# Patient Record
Sex: FEMALE | Race: WHITE | NOT HISPANIC OR LATINO | Employment: UNEMPLOYED | URBAN - METROPOLITAN AREA
[De-identification: names, ages, dates, MRNs, and addresses within clinical notes are randomized per-mention and may not be internally consistent; named-entity substitution may affect disease eponyms.]

---

## 2020-09-12 ENCOUNTER — APPOINTMENT (OUTPATIENT)
Dept: RADIOLOGY | Facility: MEDICAL CENTER | Age: 60
DRG: 392 | End: 2020-09-12
Attending: EMERGENCY MEDICINE
Payer: COMMERCIAL

## 2020-09-12 ENCOUNTER — APPOINTMENT (OUTPATIENT)
Dept: RADIOLOGY | Facility: MEDICAL CENTER | Age: 60
DRG: 392 | End: 2020-09-12
Attending: INTERNAL MEDICINE
Payer: COMMERCIAL

## 2020-09-12 ENCOUNTER — HOSPITAL ENCOUNTER (INPATIENT)
Facility: MEDICAL CENTER | Age: 60
LOS: 4 days | DRG: 392 | End: 2020-09-16
Attending: EMERGENCY MEDICINE | Admitting: INTERNAL MEDICINE
Payer: COMMERCIAL

## 2020-09-12 DIAGNOSIS — K52.9 ACUTE COLITIS: ICD-10-CM

## 2020-09-12 DIAGNOSIS — A41.9 SEPSIS WITHOUT ACUTE ORGAN DYSFUNCTION, DUE TO UNSPECIFIED ORGANISM (HCC): ICD-10-CM

## 2020-09-12 PROBLEM — F10.20 ALCOHOL DEPENDENCE (HCC): Status: ACTIVE | Noted: 2020-09-12

## 2020-09-12 PROBLEM — E87.20 LACTIC ACIDOSIS: Status: ACTIVE | Noted: 2020-09-12

## 2020-09-12 PROBLEM — D72.829 LEUKOCYTOSIS: Status: ACTIVE | Noted: 2020-09-12

## 2020-09-12 PROBLEM — R74.01 TRANSAMINITIS: Status: ACTIVE | Noted: 2020-09-12

## 2020-09-12 LAB
ABO + RH BLD: NORMAL
ABO GROUP BLD: NORMAL
ALBUMIN SERPL BCP-MCNC: 4.7 G/DL (ref 3.2–4.9)
ALBUMIN/GLOB SERPL: 2 G/DL
ALP SERPL-CCNC: 108 U/L (ref 30–99)
ALT SERPL-CCNC: 66 U/L (ref 2–50)
ANION GAP SERPL CALC-SCNC: 16 MMOL/L (ref 7–16)
APTT PPP: 22.1 SEC (ref 24.7–36)
AST SERPL-CCNC: 48 U/L (ref 12–45)
BASOPHILS # BLD AUTO: 0.4 % (ref 0–1.8)
BASOPHILS # BLD: 0.06 K/UL (ref 0–0.12)
BILIRUB SERPL-MCNC: 0.9 MG/DL (ref 0.1–1.5)
BLD GP AB SCN SERPL QL: NORMAL
BUN SERPL-MCNC: 15 MG/DL (ref 8–22)
CALCIUM SERPL-MCNC: 9.4 MG/DL (ref 8.4–10.2)
CHLORIDE SERPL-SCNC: 97 MMOL/L (ref 96–112)
CO2 SERPL-SCNC: 23 MMOL/L (ref 20–33)
COVID ORDER STATUS COVID19: NORMAL
CREAT SERPL-MCNC: 0.51 MG/DL (ref 0.5–1.4)
EOSINOPHIL # BLD AUTO: 0.01 K/UL (ref 0–0.51)
EOSINOPHIL NFR BLD: 0.1 % (ref 0–6.9)
ERYTHROCYTE [DISTWIDTH] IN BLOOD BY AUTOMATED COUNT: 38.4 FL (ref 35.9–50)
GLOBULIN SER CALC-MCNC: 2.4 G/DL (ref 1.9–3.5)
GLUCOSE SERPL-MCNC: 146 MG/DL (ref 65–99)
HCT VFR BLD AUTO: 42.7 % (ref 37–47)
HGB BLD-MCNC: 12.8 G/DL (ref 12–16)
HGB BLD-MCNC: 13 G/DL (ref 12–16)
HGB BLD-MCNC: 14.7 G/DL (ref 12–16)
IMM GRANULOCYTES # BLD AUTO: 0.07 K/UL (ref 0–0.11)
IMM GRANULOCYTES NFR BLD AUTO: 0.5 % (ref 0–0.9)
INR PPP: 1.02 (ref 0.87–1.13)
LACTATE BLD-SCNC: 2 MMOL/L (ref 0.5–2)
LACTATE BLD-SCNC: 2.2 MMOL/L (ref 0.5–2)
LIPASE SERPL-CCNC: 18 U/L (ref 7–58)
LYMPHOCYTES # BLD AUTO: 0.97 K/UL (ref 1–4.8)
LYMPHOCYTES NFR BLD: 6.8 % (ref 22–41)
MCH RBC QN AUTO: 30.6 PG (ref 27–33)
MCHC RBC AUTO-ENTMCNC: 34.4 G/DL (ref 33.6–35)
MCV RBC AUTO: 88.8 FL (ref 81.4–97.8)
MONOCYTES # BLD AUTO: 0.89 K/UL (ref 0–0.85)
MONOCYTES NFR BLD AUTO: 6.2 % (ref 0–13.4)
NEUTROPHILS # BLD AUTO: 12.3 K/UL (ref 2–7.15)
NEUTROPHILS NFR BLD: 86 % (ref 44–72)
NRBC # BLD AUTO: 0 K/UL
NRBC BLD-RTO: 0 /100 WBC
PLATELET # BLD AUTO: 163 K/UL (ref 164–446)
PMV BLD AUTO: 10.8 FL (ref 9–12.9)
POTASSIUM SERPL-SCNC: 4.2 MMOL/L (ref 3.6–5.5)
PROT SERPL-MCNC: 7.1 G/DL (ref 6–8.2)
PROTHROMBIN TIME: 13.1 SEC (ref 12–14.6)
RBC # BLD AUTO: 4.81 M/UL (ref 4.2–5.4)
RH BLD: NORMAL
SARS-COV-2 RNA RESP QL NAA+PROBE: NOTDETECTED
SODIUM SERPL-SCNC: 136 MMOL/L (ref 135–145)
SPECIMEN SOURCE: NORMAL
WBC # BLD AUTO: 14.3 K/UL (ref 4.8–10.8)

## 2020-09-12 PROCEDURE — 700111 HCHG RX REV CODE 636 W/ 250 OVERRIDE (IP): Performed by: EMERGENCY MEDICINE

## 2020-09-12 PROCEDURE — 85018 HEMOGLOBIN: CPT

## 2020-09-12 PROCEDURE — 99223 1ST HOSP IP/OBS HIGH 75: CPT | Performed by: INTERNAL MEDICINE

## 2020-09-12 PROCEDURE — 36415 COLL VENOUS BLD VENIPUNCTURE: CPT

## 2020-09-12 PROCEDURE — 700102 HCHG RX REV CODE 250 W/ 637 OVERRIDE(OP): Performed by: HOSPITALIST

## 2020-09-12 PROCEDURE — 85610 PROTHROMBIN TIME: CPT

## 2020-09-12 PROCEDURE — 700105 HCHG RX REV CODE 258: Performed by: INTERNAL MEDICINE

## 2020-09-12 PROCEDURE — U0003 INFECTIOUS AGENT DETECTION BY NUCLEIC ACID (DNA OR RNA); SEVERE ACUTE RESPIRATORY SYNDROME CORONAVIRUS 2 (SARS-COV-2) (CORONAVIRUS DISEASE [COVID-19]), AMPLIFIED PROBE TECHNIQUE, MAKING USE OF HIGH THROUGHPUT TECHNOLOGIES AS DESCRIBED BY CMS-2020-01-R: HCPCS

## 2020-09-12 PROCEDURE — 76705 ECHO EXAM OF ABDOMEN: CPT

## 2020-09-12 PROCEDURE — 83690 ASSAY OF LIPASE: CPT

## 2020-09-12 PROCEDURE — 83605 ASSAY OF LACTIC ACID: CPT | Mod: 91

## 2020-09-12 PROCEDURE — 700111 HCHG RX REV CODE 636 W/ 250 OVERRIDE (IP)

## 2020-09-12 PROCEDURE — 700105 HCHG RX REV CODE 258: Performed by: EMERGENCY MEDICINE

## 2020-09-12 PROCEDURE — 96365 THER/PROPH/DIAG IV INF INIT: CPT

## 2020-09-12 PROCEDURE — 85730 THROMBOPLASTIN TIME PARTIAL: CPT

## 2020-09-12 PROCEDURE — C9803 HOPD COVID-19 SPEC COLLECT: HCPCS | Performed by: INTERNAL MEDICINE

## 2020-09-12 PROCEDURE — 700117 HCHG RX CONTRAST REV CODE 255: Performed by: EMERGENCY MEDICINE

## 2020-09-12 PROCEDURE — 74177 CT ABD & PELVIS W/CONTRAST: CPT

## 2020-09-12 PROCEDURE — 86900 BLOOD TYPING SEROLOGIC ABO: CPT

## 2020-09-12 PROCEDURE — 80053 COMPREHEN METABOLIC PANEL: CPT

## 2020-09-12 PROCEDURE — A9270 NON-COVERED ITEM OR SERVICE: HCPCS | Performed by: HOSPITALIST

## 2020-09-12 PROCEDURE — 86901 BLOOD TYPING SEROLOGIC RH(D): CPT

## 2020-09-12 PROCEDURE — 700101 HCHG RX REV CODE 250: Performed by: INTERNAL MEDICINE

## 2020-09-12 PROCEDURE — 85025 COMPLETE CBC W/AUTO DIFF WBC: CPT

## 2020-09-12 PROCEDURE — 86850 RBC ANTIBODY SCREEN: CPT

## 2020-09-12 PROCEDURE — 99285 EMERGENCY DEPT VISIT HI MDM: CPT

## 2020-09-12 PROCEDURE — 96375 TX/PRO/DX INJ NEW DRUG ADDON: CPT

## 2020-09-12 PROCEDURE — 87040 BLOOD CULTURE FOR BACTERIA: CPT | Mod: 91

## 2020-09-12 PROCEDURE — 770021 HCHG ROOM/CARE - ISO PRIVATE

## 2020-09-12 PROCEDURE — 700111 HCHG RX REV CODE 636 W/ 250 OVERRIDE (IP): Performed by: INTERNAL MEDICINE

## 2020-09-12 RX ORDER — LISINOPRIL 10 MG/1
10 TABLET ORAL DAILY
Status: ON HOLD | COMMUNITY
End: 2020-09-13

## 2020-09-12 RX ORDER — SODIUM CHLORIDE 9 MG/ML
INJECTION, SOLUTION INTRAVENOUS CONTINUOUS
Status: DISCONTINUED | OUTPATIENT
Start: 2020-09-12 | End: 2020-09-15

## 2020-09-12 RX ORDER — DICYCLOMINE HYDROCHLORIDE 10 MG/1
10 CAPSULE ORAL
Status: ON HOLD | COMMUNITY
End: 2020-09-13

## 2020-09-12 RX ORDER — ONDANSETRON 2 MG/ML
4 INJECTION INTRAMUSCULAR; INTRAVENOUS EVERY 4 HOURS PRN
Status: DISCONTINUED | OUTPATIENT
Start: 2020-09-12 | End: 2020-09-16 | Stop reason: HOSPADM

## 2020-09-12 RX ORDER — CIPROFLOXACIN 2 MG/ML
INJECTION, SOLUTION INTRAVENOUS
Status: COMPLETED
Start: 2020-09-12 | End: 2020-09-12

## 2020-09-12 RX ORDER — SODIUM CHLORIDE 9 MG/ML
1000 INJECTION, SOLUTION INTRAVENOUS ONCE
Status: COMPLETED | OUTPATIENT
Start: 2020-09-12 | End: 2020-09-12

## 2020-09-12 RX ORDER — PROCHLORPERAZINE EDISYLATE 5 MG/ML
5-10 INJECTION INTRAMUSCULAR; INTRAVENOUS EVERY 4 HOURS PRN
Status: DISCONTINUED | OUTPATIENT
Start: 2020-09-12 | End: 2020-09-16 | Stop reason: HOSPADM

## 2020-09-12 RX ORDER — AMOXICILLIN 500 MG/1
500 CAPSULE ORAL 2 TIMES DAILY
Status: ON HOLD | COMMUNITY
End: 2020-09-13

## 2020-09-12 RX ORDER — PROMETHAZINE HYDROCHLORIDE 25 MG/1
12.5-25 SUPPOSITORY RECTAL EVERY 4 HOURS PRN
Status: DISCONTINUED | OUTPATIENT
Start: 2020-09-12 | End: 2020-09-16 | Stop reason: HOSPADM

## 2020-09-12 RX ORDER — LISINOPRIL 5 MG/1
10 TABLET ORAL DAILY
Status: DISCONTINUED | OUTPATIENT
Start: 2020-09-12 | End: 2020-09-16 | Stop reason: HOSPADM

## 2020-09-12 RX ORDER — CIPROFLOXACIN 2 MG/ML
400 INJECTION, SOLUTION INTRAVENOUS EVERY 12 HOURS
Status: DISCONTINUED | OUTPATIENT
Start: 2020-09-12 | End: 2020-09-16 | Stop reason: HOSPADM

## 2020-09-12 RX ORDER — TRAZODONE HYDROCHLORIDE 50 MG/1
50 TABLET ORAL
Status: DISCONTINUED | OUTPATIENT
Start: 2020-09-12 | End: 2020-09-16 | Stop reason: HOSPADM

## 2020-09-12 RX ORDER — SODIUM CHLORIDE AND POTASSIUM CHLORIDE 150; 900 MG/100ML; MG/100ML
INJECTION, SOLUTION INTRAVENOUS CONTINUOUS
Status: DISCONTINUED | OUTPATIENT
Start: 2020-09-12 | End: 2020-09-12

## 2020-09-12 RX ORDER — PROMETHAZINE HYDROCHLORIDE 25 MG/1
12.5-25 TABLET ORAL EVERY 4 HOURS PRN
Status: DISCONTINUED | OUTPATIENT
Start: 2020-09-12 | End: 2020-09-16 | Stop reason: HOSPADM

## 2020-09-12 RX ORDER — SIMVASTATIN 20 MG
20 TABLET ORAL NIGHTLY
Status: ON HOLD | COMMUNITY
End: 2020-09-13

## 2020-09-12 RX ORDER — ONDANSETRON 4 MG/1
4 TABLET, ORALLY DISINTEGRATING ORAL EVERY 4 HOURS PRN
Status: DISCONTINUED | OUTPATIENT
Start: 2020-09-12 | End: 2020-09-16 | Stop reason: HOSPADM

## 2020-09-12 RX ORDER — MORPHINE SULFATE 4 MG/ML
4 INJECTION, SOLUTION INTRAMUSCULAR; INTRAVENOUS ONCE
Status: COMPLETED | OUTPATIENT
Start: 2020-09-12 | End: 2020-09-12

## 2020-09-12 RX ORDER — SODIUM CHLORIDE, SODIUM LACTATE, POTASSIUM CHLORIDE, AND CALCIUM CHLORIDE .6; .31; .03; .02 G/100ML; G/100ML; G/100ML; G/100ML
1300 INJECTION, SOLUTION INTRAVENOUS
Status: DISCONTINUED | OUTPATIENT
Start: 2020-09-12 | End: 2020-09-16

## 2020-09-12 RX ADMIN — ENOXAPARIN SODIUM 40 MG: 40 INJECTION SUBCUTANEOUS at 10:03

## 2020-09-12 RX ADMIN — TRAZODONE HYDROCHLORIDE 50 MG: 50 TABLET ORAL at 22:32

## 2020-09-12 RX ADMIN — PIPERACILLIN AND TAZOBACTAM 4.5 G: 4; .5 INJECTION, POWDER, LYOPHILIZED, FOR SOLUTION INTRAVENOUS; PARENTERAL at 07:34

## 2020-09-12 RX ADMIN — METRONIDAZOLE 500 MG: 500 INJECTION, SOLUTION INTRAVENOUS at 14:23

## 2020-09-12 RX ADMIN — CIPROFLOXACIN 400 MG: 2 INJECTION, SOLUTION INTRAVENOUS at 08:17

## 2020-09-12 RX ADMIN — METRONIDAZOLE 500 MG: 500 INJECTION, SOLUTION INTRAVENOUS at 22:13

## 2020-09-12 RX ADMIN — ONDANSETRON HYDROCHLORIDE 4 MG: 2 SOLUTION INTRAMUSCULAR; INTRAVENOUS at 14:23

## 2020-09-12 RX ADMIN — SODIUM CHLORIDE: 9 INJECTION, SOLUTION INTRAVENOUS at 14:00

## 2020-09-12 RX ADMIN — IOHEXOL 100 ML: 350 INJECTION, SOLUTION INTRAVENOUS at 06:28

## 2020-09-12 RX ADMIN — METRONIDAZOLE 500 MG: 500 INJECTION, SOLUTION INTRAVENOUS at 10:02

## 2020-09-12 RX ADMIN — MORPHINE SULFATE 4 MG: 4 INJECTION INTRAVENOUS at 05:35

## 2020-09-12 RX ADMIN — POTASSIUM CHLORIDE AND SODIUM CHLORIDE: 900; 150 INJECTION, SOLUTION INTRAVENOUS at 09:51

## 2020-09-12 RX ADMIN — ONDANSETRON HYDROCHLORIDE 4 MG: 2 SOLUTION INTRAMUSCULAR; INTRAVENOUS at 09:51

## 2020-09-12 RX ADMIN — PROCHLORPERAZINE EDISYLATE 10 MG: 5 INJECTION INTRAMUSCULAR; INTRAVENOUS at 11:01

## 2020-09-12 RX ADMIN — SODIUM CHLORIDE 1000 ML: 9 INJECTION, SOLUTION INTRAVENOUS at 05:35

## 2020-09-12 RX ADMIN — CIPROFLOXACIN 400 MG: 2 INJECTION, SOLUTION INTRAVENOUS at 17:13

## 2020-09-12 SDOH — HEALTH STABILITY: MENTAL HEALTH: HOW MANY STANDARD DRINKS CONTAINING ALCOHOL DO YOU HAVE ON A TYPICAL DAY?: 3 OR 4

## 2020-09-12 SDOH — HEALTH STABILITY: MENTAL HEALTH: HOW OFTEN DO YOU HAVE 6 OR MORE DRINKS ON ONE OCCASION?: MONTHLY

## 2020-09-12 SDOH — HEALTH STABILITY: MENTAL HEALTH: HOW OFTEN DO YOU HAVE A DRINK CONTAINING ALCOHOL?: 4 OR MORE TIMES A WEEK

## 2020-09-12 ASSESSMENT — COGNITIVE AND FUNCTIONAL STATUS - GENERAL
SUGGESTED CMS G CODE MODIFIER DAILY ACTIVITY: CH
MOBILITY SCORE: 24
DAILY ACTIVITIY SCORE: 24
SUGGESTED CMS G CODE MODIFIER MOBILITY: CH

## 2020-09-12 ASSESSMENT — ENCOUNTER SYMPTOMS
SHORTNESS OF BREATH: 0
FEVER: 0
WEAKNESS: 0
VOMITING: 0
COUGH: 0
DIARRHEA: 1
FOCAL WEAKNESS: 0
HEARTBURN: 0
CHILLS: 0
HALLUCINATIONS: 0
HEADACHES: 0
BLOOD IN STOOL: 1
SORE THROAT: 0
BACK PAIN: 0
NAUSEA: 1
PALPITATIONS: 0
ABDOMINAL PAIN: 1
MYALGIAS: 0
DEPRESSION: 0
DIZZINESS: 0

## 2020-09-12 ASSESSMENT — LIFESTYLE VARIABLES
TOTAL SCORE: 5
AVERAGE NUMBER OF DAYS PER WEEK YOU HAVE A DRINK CONTAINING ALCOHOL: 7
CONSUMPTION TOTAL: POSITIVE
VISUAL DISTURBANCES: NOT PRESENT
ALCOHOL_USE: YES
HEADACHE, FULLNESS IN HEAD: NOT PRESENT
AUDITORY DISTURBANCES: NOT PRESENT
EVER HAD A DRINK FIRST THING IN THE MORNING TO STEADY YOUR NERVES TO GET RID OF A HANGOVER: NO
HAVE YOU EVER FELT YOU SHOULD CUT DOWN ON YOUR DRINKING: NO
TOTAL SCORE: 2
HAVE PEOPLE ANNOYED YOU BY CRITICIZING YOUR DRINKING: YES
ORIENTATION AND CLOUDING OF SENSORIUM: ORIENTED AND CAN DO SERIAL ADDITIONS
TREMOR: TREMOR NOT VISIBLE BUT CAN BE FELT, FINGERTIP TO FINGERTIP
HOW MANY TIMES IN THE PAST YEAR HAVE YOU HAD 5 OR MORE DRINKS IN A DAY: 4
ANXIETY: MILDLY ANXIOUS
TOTAL SCORE: 2
TOTAL SCORE: 2
ON A TYPICAL DAY WHEN YOU DRINK ALCOHOL HOW MANY DRINKS DO YOU HAVE: 2
EVER FELT BAD OR GUILTY ABOUT YOUR DRINKING: YES
PAROXYSMAL SWEATS: BARELY PERCEPTIBLE SWEATING. PALMS MOIST
AGITATION: NORMAL ACTIVITY
NAUSEA AND VOMITING: *

## 2020-09-12 ASSESSMENT — PATIENT HEALTH QUESTIONNAIRE - PHQ9
SUM OF ALL RESPONSES TO PHQ9 QUESTIONS 1 AND 2: 0
1. LITTLE INTEREST OR PLEASURE IN DOING THINGS: NOT AT ALL
2. FEELING DOWN, DEPRESSED, IRRITABLE, OR HOPELESS: NOT AT ALL

## 2020-09-12 ASSESSMENT — FIBROSIS 4 INDEX: FIB4 SCORE: 2.17

## 2020-09-12 NOTE — ASSESSMENT & PLAN NOTE
-Admits to 1 bottle of wine nightly with occasional binge drinking  -No evidence of acute alcohol withdrawal  -Initiate CIWA protocol as clinically indicated  -Seizure precautions  -Daily MVI, folate and thiamine

## 2020-09-12 NOTE — H&P
Hospital Medicine History & Physical Note    Date of Service  9/12/2020    Primary Care Physician  None    Consultants  None    Code Status  Full Code    Chief Complaint  Chief Complaint   Patient presents with   • Abdominal Pain   • Diarrhea   • Nausea   • Bloody Stools       History of Presenting Illness  60 y.o. female with a history of alcohol abuse, hyperlipidemia, hypertension who presented 9/12/2020 with severe lower abdominal pain associated with bright red blood per rectum and nausea.    Patient says she was in her normal state of health yesterday morning and was able to eat and drink normally until the afternoon when she experienced sudden onset bilateral lower quadrant discomfort.  She describes it as a cramping pain that was severe in nature.  She had associated diarrhea that contained blood.  She had frequent episodes, more than 1/h overnight and into this morning.  She has been unable to eat or drink anything due to nausea.  She came to the ER for evaluation.  She denies any raw fish or meat consumption, no sick contacts, no recent travel.  She denies fever however temperature measured 99.5.  She denies vomiting however developed severe nausea and dry heaving during her initial evaluation.  She denies any shortness of breath or cough.    She drinks alcohol daily and states she binge drinks occasionally as well.  Her averages approximately a bottle of wine nightly.    She has upcoming dental procedure therefore was prescribed Augmentin as prophylaxis prior to this procedure.  She has completed approximately 2 days of therapy.    ER course: Afebrile, blood pressure stable, pulse 65, 95% on room air.  CBC noted to be 14.3, AST 48 ALT 66, alk phos 108.  Lipase 18, lactic acid 2.2.  She was started on Zosyn by the emergency physician and IV fluids.  CT scan revealed acute, severe colitis of the mid transverse through distal sigmoid colon.  She will be admitted for IV antibiotic therapy and IVF.    Review of  Systems  Review of Systems   Constitutional: Positive for malaise/fatigue. Negative for chills and fever.   HENT: Negative for sore throat.    Respiratory: Negative for cough and shortness of breath.    Cardiovascular: Negative for chest pain and palpitations.   Gastrointestinal: Positive for abdominal pain, blood in stool, diarrhea and nausea. Negative for heartburn and vomiting.   Genitourinary: Negative for dysuria and frequency.   Musculoskeletal: Negative for back pain and myalgias.   Neurological: Negative for dizziness, focal weakness, weakness and headaches.   Psychiatric/Behavioral: Negative for depression and hallucinations.   All other systems reviewed and are negative.      Past Medical History  Hypertension, hyperlipidemia, daily alcohol use    Surgical History  Denies previous GI surgeries    Family History  Denies family history of inflammatory bowel disease    Social History   reports that she has never smoked. She has never used smokeless tobacco. She reports current alcohol use of about 12.0 oz of alcohol per week. She reports current drug use. Drug: Inhaled.    Allergies  Not on File    Medications  Prior to Admission Medications   Prescriptions Last Dose Informant Patient Reported? Taking?   amoxicillin (AMOXIL) 500 MG Cap 9/11/2020 at 0800  Yes Yes   Sig: Take 500 mg by mouth 2 times a day. Prior to dental work   dicyclomine (BENTYL) 10 MG Cap 9/11/2020 at 0800  Yes Yes   Sig: Take 10 mg by mouth 4 Times a Day,Before Meals and at Bedtime.   lisinopril (PRINIVIL) 10 MG Tab 9/11/2020 at 0800  Yes Yes   Sig: Take 10 mg by mouth every day.   simvastatin (ZOCOR) 20 MG Tab   Yes Yes   Sig: Take 20 mg by mouth every evening.      Facility-Administered Medications: None       Physical Exam  Temp:  [36.6 °C (97.9 °F)-37.3 °C (99.2 °F)] 37.3 °C (99.2 °F)  Pulse:  [59-69] 64  Resp:  [18] 18  BP: (119-143)/(68-78) 128/68  SpO2:  [94 %-99 %] 94 %    Physical Exam  Constitutional:       General: She is in  acute distress.      Appearance: She is ill-appearing, toxic-appearing and diaphoretic.      Comments: Dry heaving, uncomfortable   HENT:      Nose: Nose normal.      Mouth/Throat:      Mouth: Mucous membranes are dry.   Neck:      Musculoskeletal: No muscular tenderness.   Cardiovascular:      Rate and Rhythm: Normal rate and regular rhythm.      Pulses: Normal pulses.   Pulmonary:      Effort: Pulmonary effort is normal.      Breath sounds: Normal breath sounds.   Abdominal:      General: There is no distension.      Palpations: Abdomen is soft.      Tenderness: There is abdominal tenderness. There is guarding.   Musculoskeletal:         General: No swelling.   Lymphadenopathy:      Cervical: No cervical adenopathy.   Skin:     General: Skin is warm.      Coloration: Skin is pale.      Findings: No rash.   Neurological:      General: No focal deficit present.      Mental Status: She is oriented to person, place, and time.      Motor: Weakness present.   Psychiatric:         Mood and Affect: Mood normal.         Thought Content: Thought content normal.         Laboratory:  Recent Labs     09/12/20  0525   WBC 14.3*   RBC 4.81   HEMOGLOBIN 14.7   HEMATOCRIT 42.7   MCV 88.8   MCH 30.6   MCHC 34.4   RDW 38.4   PLATELETCT 163*   MPV 10.8     Recent Labs     09/12/20  0517   SODIUM 136   POTASSIUM 4.2   CHLORIDE 97   CO2 23   GLUCOSE 146*   BUN 15   CREATININE 0.51   CALCIUM 9.4     Recent Labs     09/12/20  0517   ALTSGPT 66*   ASTSGOT 48*   ALKPHOSPHAT 108*   TBILIRUBIN 0.9   LIPASE 18   GLUCOSE 146*     Recent Labs     09/12/20  0517   APTT 22.1*   INR 1.02     No results for input(s): NTPROBNP in the last 72 hours.      No results for input(s): TROPONINT in the last 72 hours.    Imaging:  CT-ABDOMEN-PELVIS WITH   Final Result         1.  Severe colitis of the mid transverse through the distal sigmoid colon, consider infectious, inflammatory, or ischemic etiologies. Differential could include intramural hematoma in  the appropriate clinical setting however density of bowel wall is less    than would be typically expected for hematoma.   2.  Hepatomegaly and diffuse hepatic steatosis      US-RUQ    (Results Pending)         Assessment/Plan:  I anticipate this patient will require at least two midnights for appropriate medical management, necessitating inpatient admission.    * Colitis  Assessment & Plan  Seen on imaging.  I suspect infectious versus ischemic etiology and less likely inflammatory bowel disease given no personal or family history.  Check stool culture, fecal WBCs  Fecal calprotectin  Check C. Difficile  Empiric coverage with Cipro Flagyl in the setting of acute colitis  IVF, avoid hypotension    Lactic acidosis  Assessment & Plan  Due to colitis  IVF  Trend lactate    Leukocytosis  Assessment & Plan  Likely related to colitis seen on imaging.  She has hematochezia and diarrhea.  She recently took Augmentin however only for a few days due to to prophylaxis for dental procedure.  Check C. difficile  IV fluids  Empiric antibiotics with Cipro and Flagyl    Transaminitis  Assessment & Plan  She drinks 4 glasses of wine daily, denies right upper quadrant pain, rather pain is localized to bilateral lower quadrants.  We will check RUQ US for evidence of gallstone versus fatty liver versus cirrhosis in the setting of heavy alcohol use  For now I will hold her statin  Repeat LFTs in a.m.

## 2020-09-12 NOTE — ED PROVIDER NOTES
"ED Provider Note    CHIEF COMPLAINT  Chief Complaint   Patient presents with   • Abdominal Pain   • Diarrhea   • Nausea   • Bloody Stools       HPI  Ayde Stewart is a 60 y.o. female who presents with a chief complaint of abdominal pain and hematochezia.  The patient notes that she has had abdominal discomfort for the past 2 to 3 months which she describes as \"not feeling comfortable in my own skin\".  She notes mild distention but no significant pain.  She and her  live in the [facility] but came to the [facility] yesterday to get away from the smoke in the [facility].  After arrival and approximately 30 minutes after eating breakfast she and her  went on a bike ride during which time she developed diffuse abdominal cramps.  She went to the restroom where she had to strain in order to have a small, \"muddy\" stool.  After this, she had large-volume diarrhea which was mixed in with a large volume of bright red blood.  This lasted for approximately 1 hour.  Her  called the paramedics and when they arrived she was feeling better so decided not to go to the hospital.  She was still having some mild cramping and so her  drove her back to her BnB at which time she had multiple additional episodes of nishant hematochezia and clots.  She was then seen at [facility] emergency room where labs were performed and she was discharged with a prescription for Bentyl.  After return to her hotel, she had worsening abdominal cramping which she could not get rid of and asked her  to once again called the paramedics at which time she was brought to [facility] for higher level of care.  She has never had a colonoscopy and is not anticoagulated.  She denies any fevers or chills, chest pain, shortness of breath, nausea, vomiting, dysuria.  She has a remote history of appendectomy.  Patient does note routine significant alcohol use; drinks approximately 1 bottle of wine at least 5 days/week and has no history of " alcohol withdrawal seizure.  No NSAID use.    REVIEW OF SYSTEMS  See HPI for further details.  Abdominal cramping.  Hematochezia.  All other systems are negative.     PAST MEDICAL HISTORY       SOCIAL HISTORY  Social History     Tobacco Use   • Smoking status: Not on file   Substance and Sexual Activity   • Alcohol use: Not on file   • Drug use: Not on file   • Sexual activity: Not on file       SURGICAL HISTORY  patient denies any surgical history    CURRENT MEDICATIONS  Home Medications    **Home medications have not yet been reviewed for this encounter**         ALLERGIES  Not on File    PHYSICAL EXAM  VITAL SIGNS: /77   Pulse 64   Temp 36.6 °C (97.9 °F) (Temporal)   Resp 18   Wt 79 kg (174 lb 2.6 oz)   SpO2 99%    Pulse ox interpretation: I interpret this pulse ox as normal.  Constitutional: Alert in no apparent distress.  HENT: No signs of trauma, Bilateral external ears normal, Nose normal.  Dry mucous membranes.  Eyes: Pupils are equal and reactive, Conjunctiva normal, Non-icteric.   Neck: Normal range of motion, No tenderness, Supple, No stridor.   Lymphatic: No lymphadenopathy noted.   Cardiovascular: Regular rate and rhythm, no murmurs.   Thorax & Lungs: Normal breath sounds, No respiratory distress, No wheezing, No chest tenderness.   Abdomen: Bowel sounds normal, Soft, No tenderness, No masses, No pulsatile masses. No peritoneal signs.  Rectal: Gross bright red blood in the rectal vault.  No obvious external or internal hemorrhoids.  Skin: Warm, Dry, No erythema, No rash.   Back: Normal alignment.  Extremities: Intact distal pulses, No edema, No tenderness, No cyanosis.  Musculoskeletal: Good range of motion in all major joints. No tenderness to palpation or major deformities noted.   Neurologic: Alert, Normal motor function, Normal sensory function, No focal deficits noted.   Psychiatric: Affect normal, Judgment normal, Mood normal.     DIAGNOSTIC STUDIES /  PROCEDURES    LABS  CBC  CMP  Lipase  Type and hold  Lactic acid  PT/INR  PTT    RADIOLOGY  CT abdomen/pelvis    COURSE & MEDICAL DECISION MAKING  Pertinent Labs & Imaging studies reviewed. (See chart for details)  This is a 60-year-old female with no significant past medical history who is here with severe abdominal cramping and significant hematochezia.  Differential diagnosis includes, but is not limited to, colitis, mesenteric ischemia, diverticulosis, diverticulitis, viral syndrome, neoplasm.  Arrives afebrile with slightly elevated blood pressure but otherwise normal vital signs.  Appears dehydrated with dry mucous membranes but nontoxic.  Abdomen is soft without peritoneal signs.  She does have significant bright red blood in the rectal vault.    Patient started on IV fluids for dehydration and significant fluid loss through diarrhea and given a dose of morphine for continued abdominal cramping.    CBC reveals leukocytosis with neutrophilic predominance.  H/H is normal.  Metabolic panel reveals blood glucose of 146 with transaminitis AST/ALT/alk phos of 48/66/108.  Lipase is normal.  Lactic acid slightly elevated to 2.2.  Coags are normal.    Upon repeat evaluation, patient is resting comfortably.  She notes that the morphine improved her abdominal discomfort.     CT scan of the abdomen/pelvis pending although I suspect that this is colitis.  Antibiotics deferred until return of CT scan but given her alcohol use Zosyn will be an appropriate choice should this be colitis.  Patient care was transferred to my colleague pending return of CT scan read and my colleague will discuss the patient with the hospitalist for admission.    HYDRATION  HYDRATION: Based on the patient's presentation of Acute Diarrhea and Dehydration the patient was given IV fluids. IV Hydration was used because oral hydration was not adequate alone. Upon recheck following hydration, the patient was Improved.    FINAL IMPRESSION  1.  Abdominal  cramping  2.  Hematochezia  3.  Lactic acidosis  4.  Leukocytosis  5.  Transaminitis  6.  Alcohol abuse    Electronically signed by: Juan Griggs M.D., 9/12/2020 5:13 AM

## 2020-09-12 NOTE — PROGRESS NOTES
2 RN Skin Check    2 RN skin check complete.   Devices in place: SCDs.  Skin assessed under devices: yes.  Confirmed pressure ulcers found on: None.  New potential pressure ulcers noted on None. Wound consult placed No.  The following interventions in place Pillows.    No signs of any wounds upon admit

## 2020-09-12 NOTE — ASSESSMENT & PLAN NOTE
-Noted on imaging  -Was on ABX 2 days prior to admission.  Unable to get C. difficile due to bloody stools  -Ongoing bloody stools.  Hgb stable  -Continue Flagyl and Cipro  -Ongoing abdominal pain.  KUB today showed no acute findings  -Clear liquid diet.  ADAT  -Follow-up with PCP in [place]

## 2020-09-12 NOTE — ED PROVIDER NOTES
ED Provider Note    0600: Assumed care from Dr. Griggs.  This patient is a 60-year-old female with history of alcohol abuse who presents for evaluation of abdominal discomfort with hematochezia.  Patient had leukocytosis and elevated lactic acid, clinically there is concern for colitis.  CT imaging pending at change of shift, plan is for admission.    0745: I spoke with the hospitalist Dr. Oneill who concurs with plan and accepts the patient to her service, I have ordered Zosyn 4.5 g IV for coverage of colitis, I have ordered blood cultures, additional crystalloid to equal 30 cc/kg for concern of potential septicemia, cultures pending.    Patient Vitals for the past 24 hrs:   BP Temp Temp src Pulse Resp SpO2 Weight   09/12/20 0640 143/78 -- -- 60 -- 94 % --   09/12/20 0625 -- -- -- 69 -- 95 % --   09/12/20 0622 119/71 -- -- -- -- -- --   09/12/20 0618 119/71 -- -- -- -- -- --   09/12/20 0523 -- -- -- 64 -- 99 % --   09/12/20 0516 -- -- -- 63 -- 98 % --   09/12/20 0515 143/77 36.6 °C (97.9 °F) Temporal (!) 59 18 96 % 79 kg (174 lb 2.6 oz)     HYDRATION: Based on the patient's presentation of Acute Diarrhea, Dehydration and Sepsis the patient was given IV fluids. IV Hydration was used because oral hydration was not as rapid as required. Upon recheck following hydration, the patient was Clinically stable, heart rate 60.    Results for orders placed or performed during the hospital encounter of 09/12/20   Comp Metabolic Panel   Result Value Ref Range    Sodium 136 135 - 145 mmol/L    Potassium 4.2 3.6 - 5.5 mmol/L    Chloride 97 96 - 112 mmol/L    Co2 23 20 - 33 mmol/L    Anion Gap 16.0 7.0 - 16.0    Glucose 146 (H) 65 - 99 mg/dL    Bun 15 8 - 22 mg/dL    Creatinine 0.51 0.50 - 1.40 mg/dL    Calcium 9.4 8.4 - 10.2 mg/dL    AST(SGOT) 48 (H) 12 - 45 U/L    ALT(SGPT) 66 (H) 2 - 50 U/L    Alkaline Phosphatase 108 (H) 30 - 99 U/L    Total Bilirubin 0.9 0.1 - 1.5 mg/dL    Albumin 4.7 3.2 - 4.9 g/dL    Total Protein 7.1 6.0 -  8.2 g/dL    Globulin 2.4 1.9 - 3.5 g/dL    A-G Ratio 2.0 g/dL   COD - Adult (Type and Screen)   Result Value Ref Range    ABO Grouping Only B     Rh Grouping Only POS     Antibody Screen-Cod NEG    PT/INR   Result Value Ref Range    PT 13.1 12.0 - 14.6 sec    INR 1.02 0.87 - 1.13   PTT   Result Value Ref Range    APTT 22.1 (L) 24.7 - 36.0 sec   LIPASE   Result Value Ref Range    Lipase 18 7 - 58 U/L   LACTIC ACID   Result Value Ref Range    Lactic Acid 2.2 (H) 0.5 - 2.0 mmol/L   CBC WITH DIFFERENTIAL   Result Value Ref Range    WBC 14.3 (H) 4.8 - 10.8 K/uL    RBC 4.81 4.20 - 5.40 M/uL    Hemoglobin 14.7 12.0 - 16.0 g/dL    Hematocrit 42.7 37.0 - 47.0 %    MCV 88.8 81.4 - 97.8 fL    MCH 30.6 27.0 - 33.0 pg    MCHC 34.4 33.6 - 35.0 g/dL    RDW 38.4 35.9 - 50.0 fL    Platelet Count 163 (L) 164 - 446 K/uL    MPV 10.8 9.0 - 12.9 fL    Neutrophils-Polys 86.00 (H) 44.00 - 72.00 %    Lymphocytes 6.80 (L) 22.00 - 41.00 %    Monocytes 6.20 0.00 - 13.40 %    Eosinophils 0.10 0.00 - 6.90 %    Basophils 0.40 0.00 - 1.80 %    Immature Granulocytes 0.50 0.00 - 0.90 %    Nucleated RBC 0.00 /100 WBC    Neutrophils (Absolute) 12.30 (H) 2.00 - 7.15 K/uL    Lymphs (Absolute) 0.97 (L) 1.00 - 4.80 K/uL    Monos (Absolute) 0.89 (H) 0.00 - 0.85 K/uL    Eos (Absolute) 0.01 0.00 - 0.51 K/uL    Baso (Absolute) 0.06 0.00 - 0.12 K/uL    Immature Granulocytes (abs) 0.07 0.00 - 0.11 K/uL    NRBC (Absolute) 0.00 K/uL   ESTIMATED GFR   Result Value Ref Range    GFR If African American >60 >60 mL/min/1.73 m 2    GFR If Non African American >60 >60 mL/min/1.73 m 2   Leukocytosis with left shift, elevated lactic acid noted    CT-ABDOMEN-PELVIS WITH   Final Result         1.  Severe colitis of the mid transverse through the distal sigmoid colon, consider infectious, inflammatory, or ischemic etiologies. Differential could include intramural hematoma in the appropriate clinical setting however density of bowel wall is less    than would be typically  expected for hematoma.   2.  Hepatomegaly and diffuse hepatic steatosis        IMPRESSION:  1. Acute colitis     2. Sepsis without acute organ dysfunction, due to unspecified organism (HCC)         Patient is admitted in guarded condition to the care of the hospitalist Dr. Oneill the medical surgery floor.

## 2020-09-12 NOTE — ASSESSMENT & PLAN NOTE
-Likely due to EtOH dependence  -Elevated liver enzymes.  RUQ US showed increased liver echogenicity consistent with hepatic steatosis  -Holding statin for now  -Ongoing education and counseling regarding alcohol abstinence  -Follow-up with outpatient PCP in [place]

## 2020-09-12 NOTE — PROGRESS NOTES
Pt received to room 307   - Pt not in any distress   - VSS stable   - C/o Colitis type ABD pain  - (+) Bloody stool, loose and bright red in toilet -- > Unable to place hat in time for C.Diff screening   - Covid completed @ ED, collected in Epic by RN   - NPO until RUQ U/S  - Lactic trends 2.2 -- >   - H & H stable, no indication for transfusion at this time   - Hepatic panel elevated   - Pt admits to drinking 1 bottle of wine/ day roughly  - Last drink 1 day ago, Will monitor closely for withdrawals (CIWA 5)        Pt continuing to have large amounts of bright red blood @ Stool - MD is aware  - Stool sent to lab by RN    Pt c/o:  - RUQ ABD pain  - Nausea   - Generalized weakness   - Requesting blinds to shade & lights to be off

## 2020-09-12 NOTE — ED TRIAGE NOTES
Pt was seen at [facility] for abdominal pain and bloody stools. Pt states that she was sent home dx with hemorrhoids. Pt continued to feel worse and decided to come to [facility] for evaluation.

## 2020-09-13 PROBLEM — E87.20 LACTIC ACIDOSIS: Status: RESOLVED | Noted: 2020-09-12 | Resolved: 2020-09-13

## 2020-09-13 LAB
ALBUMIN SERPL BCP-MCNC: 4.1 G/DL (ref 3.2–4.9)
ALBUMIN/GLOB SERPL: 1.4 G/DL
ALP SERPL-CCNC: 116 U/L (ref 30–99)
ALT SERPL-CCNC: 43 U/L (ref 2–50)
ANION GAP SERPL CALC-SCNC: 12 MMOL/L (ref 7–16)
AST SERPL-CCNC: 24 U/L (ref 12–45)
BASOPHILS # BLD AUTO: 0.6 % (ref 0–1.8)
BASOPHILS # BLD: 0.07 K/UL (ref 0–0.12)
BILIRUB SERPL-MCNC: 0.8 MG/DL (ref 0.1–1.5)
BUN SERPL-MCNC: 6 MG/DL (ref 8–22)
CALCIUM SERPL-MCNC: 9 MG/DL (ref 8.4–10.2)
CHLORIDE SERPL-SCNC: 102 MMOL/L (ref 96–112)
CO2 SERPL-SCNC: 24 MMOL/L (ref 20–33)
CREAT SERPL-MCNC: 0.57 MG/DL (ref 0.5–1.4)
EKG IMPRESSION: NORMAL
EOSINOPHIL # BLD AUTO: 0.03 K/UL (ref 0–0.51)
EOSINOPHIL NFR BLD: 0.3 % (ref 0–6.9)
ERYTHROCYTE [DISTWIDTH] IN BLOOD BY AUTOMATED COUNT: 40.4 FL (ref 35.9–50)
GLOBULIN SER CALC-MCNC: 2.9 G/DL (ref 1.9–3.5)
GLUCOSE SERPL-MCNC: 134 MG/DL (ref 65–99)
HCT VFR BLD AUTO: 39.2 % (ref 37–47)
HGB BLD-MCNC: 12.3 G/DL (ref 12–16)
HGB BLD-MCNC: 12.7 G/DL (ref 12–16)
HGB BLD-MCNC: 13.2 G/DL (ref 12–16)
IMM GRANULOCYTES # BLD AUTO: 0.04 K/UL (ref 0–0.11)
IMM GRANULOCYTES NFR BLD AUTO: 0.3 % (ref 0–0.9)
LYMPHOCYTES # BLD AUTO: 1.2 K/UL (ref 1–4.8)
LYMPHOCYTES NFR BLD: 10.5 % (ref 22–41)
MAGNESIUM SERPL-MCNC: 2.1 MG/DL (ref 1.5–2.5)
MCH RBC QN AUTO: 30.5 PG (ref 27–33)
MCHC RBC AUTO-ENTMCNC: 33.7 G/DL (ref 33.6–35)
MCV RBC AUTO: 90.5 FL (ref 81.4–97.8)
MONOCYTES # BLD AUTO: 1.11 K/UL (ref 0–0.85)
MONOCYTES NFR BLD AUTO: 9.7 % (ref 0–13.4)
NEUTROPHILS # BLD AUTO: 9.03 K/UL (ref 2–7.15)
NEUTROPHILS NFR BLD: 78.6 % (ref 44–72)
NRBC # BLD AUTO: 0 K/UL
NRBC BLD-RTO: 0 /100 WBC
PHOSPHATE SERPL-MCNC: 2.2 MG/DL (ref 2.5–4.5)
PLATELET # BLD AUTO: 147 K/UL (ref 164–446)
PMV BLD AUTO: 11.5 FL (ref 9–12.9)
POTASSIUM SERPL-SCNC: 3.5 MMOL/L (ref 3.6–5.5)
PROT SERPL-MCNC: 7 G/DL (ref 6–8.2)
RBC # BLD AUTO: 4.33 M/UL (ref 4.2–5.4)
SODIUM SERPL-SCNC: 138 MMOL/L (ref 135–145)
WBC # BLD AUTO: 11.5 K/UL (ref 4.8–10.8)

## 2020-09-13 PROCEDURE — 770021 HCHG ROOM/CARE - ISO PRIVATE

## 2020-09-13 PROCEDURE — 83735 ASSAY OF MAGNESIUM: CPT

## 2020-09-13 PROCEDURE — 700101 HCHG RX REV CODE 250: Performed by: INTERNAL MEDICINE

## 2020-09-13 PROCEDURE — 93005 ELECTROCARDIOGRAM TRACING: CPT | Performed by: HOSPITALIST

## 2020-09-13 PROCEDURE — A9270 NON-COVERED ITEM OR SERVICE: HCPCS | Performed by: INTERNAL MEDICINE

## 2020-09-13 PROCEDURE — 700102 HCHG RX REV CODE 250 W/ 637 OVERRIDE(OP): Performed by: NURSE PRACTITIONER

## 2020-09-13 PROCEDURE — 99233 SBSQ HOSP IP/OBS HIGH 50: CPT | Performed by: INTERNAL MEDICINE

## 2020-09-13 PROCEDURE — 700105 HCHG RX REV CODE 258: Performed by: INTERNAL MEDICINE

## 2020-09-13 PROCEDURE — 700111 HCHG RX REV CODE 636 W/ 250 OVERRIDE (IP): Performed by: INTERNAL MEDICINE

## 2020-09-13 PROCEDURE — 85025 COMPLETE CBC W/AUTO DIFF WBC: CPT

## 2020-09-13 PROCEDURE — 700102 HCHG RX REV CODE 250 W/ 637 OVERRIDE(OP): Performed by: HOSPITALIST

## 2020-09-13 PROCEDURE — 80053 COMPREHEN METABOLIC PANEL: CPT

## 2020-09-13 PROCEDURE — A9270 NON-COVERED ITEM OR SERVICE: HCPCS | Performed by: HOSPITALIST

## 2020-09-13 PROCEDURE — 700102 HCHG RX REV CODE 250 W/ 637 OVERRIDE(OP): Performed by: INTERNAL MEDICINE

## 2020-09-13 PROCEDURE — 85018 HEMOGLOBIN: CPT

## 2020-09-13 PROCEDURE — 93010 ELECTROCARDIOGRAM REPORT: CPT | Performed by: INTERNAL MEDICINE

## 2020-09-13 PROCEDURE — A9270 NON-COVERED ITEM OR SERVICE: HCPCS | Performed by: NURSE PRACTITIONER

## 2020-09-13 PROCEDURE — 84100 ASSAY OF PHOSPHORUS: CPT

## 2020-09-13 RX ORDER — PROMETHAZINE HYDROCHLORIDE 12.5 MG/1
12.5-25 TABLET ORAL EVERY 4 HOURS PRN
Qty: 30 TAB | Refills: 0 | Status: SHIPPED
Start: 2020-09-13 | End: 2020-09-16

## 2020-09-13 RX ORDER — ONDANSETRON 4 MG/1
4 TABLET, ORALLY DISINTEGRATING ORAL EVERY 4 HOURS PRN
Qty: 10 TAB | Refills: 0 | Status: SHIPPED
Start: 2020-09-13 | End: 2020-09-16

## 2020-09-13 RX ORDER — PROCHLORPERAZINE EDISYLATE 5 MG/ML
5-10 INJECTION INTRAMUSCULAR; INTRAVENOUS EVERY 4 HOURS PRN
Refills: 0 | Status: SHIPPED
Start: 2020-09-13 | End: 2020-09-16

## 2020-09-13 RX ORDER — TRAZODONE HYDROCHLORIDE 50 MG/1
50 TABLET ORAL
Qty: 30 TAB | Refills: 3 | Status: SHIPPED
Start: 2020-09-13 | End: 2020-09-16

## 2020-09-13 RX ORDER — PROMETHAZINE HYDROCHLORIDE 12.5 MG/1
12.5-25 SUPPOSITORY RECTAL EVERY 4 HOURS PRN
Qty: 10 SUPPOSITORY | Refills: 0 | Status: SHIPPED
Start: 2020-09-13 | End: 2020-09-16

## 2020-09-13 RX ORDER — CIPROFLOXACIN 2 MG/ML
400 INJECTION, SOLUTION INTRAVENOUS EVERY 12 HOURS
Qty: 2800 ML | Status: SHIPPED
Start: 2020-09-13 | End: 2020-09-16

## 2020-09-13 RX ORDER — ONDANSETRON 2 MG/ML
4 INJECTION INTRAMUSCULAR; INTRAVENOUS EVERY 4 HOURS PRN
Qty: 84 ML | Status: SHIPPED
Start: 2020-09-13 | End: 2020-09-16

## 2020-09-13 RX ORDER — LISINOPRIL 10 MG/1
10 TABLET ORAL DAILY
Qty: 30 TAB | Status: SHIPPED
Start: 2020-09-14 | End: 2020-09-16

## 2020-09-13 RX ORDER — POTASSIUM CHLORIDE 20 MEQ/1
20 TABLET, EXTENDED RELEASE ORAL ONCE
Status: COMPLETED | OUTPATIENT
Start: 2020-09-13 | End: 2020-09-13

## 2020-09-13 RX ADMIN — POTASSIUM CHLORIDE 20 MEQ: 1500 TABLET, EXTENDED RELEASE ORAL at 09:01

## 2020-09-13 RX ADMIN — METRONIDAZOLE 500 MG: 500 INJECTION, SOLUTION INTRAVENOUS at 21:09

## 2020-09-13 RX ADMIN — METRONIDAZOLE 500 MG: 500 INJECTION, SOLUTION INTRAVENOUS at 13:05

## 2020-09-13 RX ADMIN — PROCHLORPERAZINE EDISYLATE 10 MG: 5 INJECTION INTRAMUSCULAR; INTRAVENOUS at 18:19

## 2020-09-13 RX ADMIN — METRONIDAZOLE 500 MG: 500 INJECTION, SOLUTION INTRAVENOUS at 05:56

## 2020-09-13 RX ADMIN — SODIUM CHLORIDE: 9 INJECTION, SOLUTION INTRAVENOUS at 16:52

## 2020-09-13 RX ADMIN — TRAZODONE HYDROCHLORIDE 50 MG: 50 TABLET ORAL at 21:09

## 2020-09-13 RX ADMIN — SODIUM CHLORIDE: 9 INJECTION, SOLUTION INTRAVENOUS at 05:56

## 2020-09-13 RX ADMIN — CIPROFLOXACIN 400 MG: 2 INJECTION, SOLUTION INTRAVENOUS at 06:59

## 2020-09-13 RX ADMIN — LISINOPRIL 10 MG: 5 TABLET ORAL at 05:56

## 2020-09-13 RX ADMIN — CIPROFLOXACIN 400 MG: 2 INJECTION, SOLUTION INTRAVENOUS at 16:52

## 2020-09-13 ASSESSMENT — ENCOUNTER SYMPTOMS
FEVER: 0
VOMITING: 0
DIARRHEA: 1
FALLS: 0
MYALGIAS: 0
COUGH: 0
SHORTNESS OF BREATH: 0
CHILLS: 0
DEPRESSION: 0
BLOOD IN STOOL: 1
EYE PAIN: 0
ABDOMINAL PAIN: 1
SORE THROAT: 0
HEADACHES: 0
EYE DISCHARGE: 0
NAUSEA: 1
DIZZINESS: 0

## 2020-09-13 ASSESSMENT — LIFESTYLE VARIABLES: SUBSTANCE_ABUSE: 1

## 2020-09-13 NOTE — DISCHARGE PLANNING
JUSTINEW received call from [facility] ([phone]) requesting that pt be transferred to a [facility] if stable.  also requesting contact information for MD so a peer to peer can be arranged. JUSTINEW updated MD.

## 2020-09-13 NOTE — DISCHARGE PLANNING
LSW received f/u call from [facility] CM April that insurance MD reviewed case and does not want to transfer today. Pt will be evaluated again tomorrow.

## 2020-09-13 NOTE — CARE PLAN
Problem: Discharge Barriers/Planning  Goal: Patient's continuum of care needs will be met  Outcome: PROGRESSING AS EXPECTED  Intervention: Collaborate with Transitional Care Team and Interdisciplinary Team to meet discharge needs  Note: Patient to transfer to [facility] facility. Providers and  aware. Patient informed.     Problem: Pain Management  Goal: Pain level will decrease to patient's comfort goal  Outcome: PROGRESSING AS EXPECTED  Intervention: Follow pain managment plan developed in collaboration with patient and Interdisciplinary Team  Note: Patient denies pain at this time. Anti-nausea agents ordered if needed.

## 2020-09-13 NOTE — PROGRESS NOTES
Report received from [facility]. Pt laying in bed at the time of report. Plan of care assumed. Safety precautions in place and call light within reach.

## 2020-09-13 NOTE — PROGRESS NOTES
[facility] Medicine Daily Progress Note    Date of Service  9/13/2020    Chief Complaint  60 y.o. female admitted 9/12/2020 with severe abdominal pain and bright red blood per rectum.    Hospital Course    This is a 60 y.o. female here with known medical history of alcohol abuse, hyperlipidemia and hypertension. Patient was visiting [place] with her  on vacation when she developed severe lower abdominal pain associated with bright red blood per rectum.  Patient described it as cramping in nature and associated with diarrhea. Patient does endorse daily drinking with occasional binging. Average daily drinking is a bottle of wine nightly. Of note patient has an upcoming dental procedure and had been taking approximately 2 days of Augmentin in preparation for this. On presentation to the ER patient's Vital signs were stable, WBC noted to be elevated at 14.3, Ast 48, Alt 66, alk phos 108, lactic acid 2.2, Hgb/hct 14.7/42.7. Patient initially started on Zosyn in the ER accompanied with IV fluid hydration. CT scan of the abdomen was completed which showed severe colitis of the mid transverse through distal sigmoid colon. Patient was then admitted to medical floor for continued antibiotic therapy and IV hydration. Patients IV antibiotics transitioned to Cipro/ Flagyl in the setting of acute colitis. Attempted to check C. Diff, given recent antibiotic usage, however stool contained too much blood and sample was denied by lab. Right upper quadrant ultrasound was completed given patient's significant ETOH history and elevated LFT's which showed Increased liver echogenicity could indicate hepatic steatosis. Patient counseled on need for alcohol cessation. Blood cultures were obtained and remain preliminary negative to date. Covid swab negative. Hgb has remained stable and checked every 8 hours. Patient placed on Lovenox for DVT prophylaxis, given no acute drop in Hgb.          Interval Problem Update  9/13- Contacted by  patient's insurance who are requesting transfer to one of their included facilities. Patient is stable at this time and may be transported to another facility. On exam patient continuing to have small amounts of blood per rectum, but states significant lessening of abdominal pain. Insurance has declined transfer today and will re-evaluate tomorrow.  Patient continue with IV antibiotics/IV fluids and supportive management.    Consultants/Specialty  None    Code Status  Full Code    Disposition  TBD- possible transition to oral antibiotics if patient significantly improves, otherwise transfer to [facility] tomorrow    Review of Systems  Review of Systems   Constitutional: Negative for chills, fever and malaise/fatigue.   HENT: Negative for congestion and sore throat.    Eyes: Negative for pain and discharge.   Respiratory: Negative for cough and shortness of breath.    Cardiovascular: Negative for chest pain and leg swelling.   Gastrointestinal: Positive for abdominal pain, blood in stool, diarrhea and nausea. Negative for vomiting.   Genitourinary: Negative for dysuria, frequency and urgency.   Musculoskeletal: Negative for falls and myalgias.   Skin: Negative for rash.   Neurological: Negative for dizziness and headaches.   Psychiatric/Behavioral: Positive for substance abuse. Negative for depression.        Physical Exam  Temp:  [36.9 °C (98.5 °F)-37.3 °C (99.1 °F)] 37 °C (98.6 °F)  Pulse:  [56-64] 64  Resp:  [18] 18  BP: (125-140)/(65-74) 125/69  SpO2:  [92 %-95 %] 95 %    Physical Exam  Vitals signs and nursing note reviewed.   Constitutional:       General: She is awake.      Appearance: She is overweight. She is not ill-appearing.   HENT:      Head: Normocephalic and atraumatic.      Mouth/Throat:      Lips: Pink.      Mouth: Mucous membranes are moist.   Eyes:      General: Lids are normal.   Cardiovascular:      Rate and Rhythm: Normal rate.      Heart sounds: Normal heart sounds. No friction rub.    Pulmonary:      Effort: Pulmonary effort is normal. No respiratory distress.      Breath sounds: Normal breath sounds. No decreased breath sounds or wheezing.   Abdominal:      General: Bowel sounds are normal.      Palpations: Abdomen is soft.      Tenderness: There is generalized abdominal tenderness and tenderness in the right lower quadrant and left lower quadrant. There is no rebound.   Musculoskeletal:      Comments: Moves all extremities   Skin:     General: Skin is warm and dry.   Neurological:      General: No focal deficit present.      Mental Status: She is alert and oriented to person, place, and time. Mental status is at baseline.   Psychiatric:         Attention and Perception: Attention normal.         Mood and Affect: Mood normal.         Speech: Speech normal.         Behavior: Behavior is cooperative.         Cognition and Memory: Cognition normal.         Fluids    Intake/Output Summary (Last 24 hours) at 9/13/2020 1100  Last data filed at 9/13/2020 0759  Gross per 24 hour   Intake 2198.33 ml   Output --   Net 2198.33 ml       Laboratory  Recent Labs     09/12/20 0525 09/12/20 2058 09/13/20 0232 09/13/20 0952   WBC 14.3*  --   --  11.5*  --    RBC 4.81  --   --  4.33  --    HEMOGLOBIN 14.7   < > 13.0 13.2 12.7   HEMATOCRIT 42.7  --   --  39.2  --    MCV 88.8  --   --  90.5  --    MCH 30.6  --   --  30.5  --    MCHC 34.4  --   --  33.7  --    RDW 38.4  --   --  40.4  --    PLATELETCT 163*  --   --  147*  --    MPV 10.8  --   --  11.5  --     < > = values in this interval not displayed.     Recent Labs     09/12/20 0517 09/13/20 0232   SODIUM 136 138   POTASSIUM 4.2 3.5*   CHLORIDE 97 102   CO2 23 24   GLUCOSE 146* 134*   BUN 15 6*   CREATININE 0.51 0.57   CALCIUM 9.4 9.0     Recent Labs     09/12/20 0517   APTT 22.1*   INR 1.02               Imaging  US-RUQ   Final Result      1.  Increased liver echogenicity could indicate hepatic steatosis.      2.  Exam is otherwise within normal  limits.      CT-ABDOMEN-PELVIS WITH   Final Result         1.  Severe colitis of the mid transverse through the distal sigmoid colon, consider infectious, inflammatory, or ischemic etiologies. Differential could include intramural hematoma in the appropriate clinical setting however density of bowel wall is less    than would be typically expected for hematoma.   2.  Hepatomegaly and diffuse hepatic steatosis           Assessment/Plan  * Colitis  Assessment & Plan  Seen on imaging.  I suspect infectious versus ischemic etiology and less likely inflammatory bowel disease given no personal or family history.  Check stool culture, fecal WBCs  Fecal calprotectin  Check C. Difficile when able  Empiric coverage with Cipro Flagyl in the setting of acute colitis  IVF, avoid hypotension    Alcohol dependence (HCC)  Assessment & Plan  Drinks 4 glasses of wine nightly, denies history of withdrawal in the past  Monitor for evidence of withdrawal  Initiate CIWA protocol if she develops tremors, diaphoresis  Alcohol cessation discussed    Leukocytosis  Assessment & Plan  Likely related to colitis seen on imaging.  She has hematochezia and diarrhea.  She recently took Augmentin however only for a few days due to to prophylaxis for dental procedure.  Check C. Difficile-when able, lab declined previous sample due to blood with stool  IV fluids  Empiric antibiotics with Cipro and Flagyl    Transaminitis  Assessment & Plan  She drinks 4 glasses of wine daily, denies right upper quadrant pain, rather pain is localized to bilateral lower quadrants.  RUQ US Increased liver echogenicity could indicate hepatic steatosis.  For now I will hold her statin  Repeat LFTs in a.m. show improvement continue to monitor       VTE prophylaxis: Lovenox

## 2020-09-13 NOTE — CARE PLAN
Problem: Communication  Goal: The ability to communicate needs accurately and effectively will improve  Outcome: PROGRESSING AS EXPECTED   Pt uses call light appropriately to alert staff to her needs.    Problem: Safety  Goal: Will remain free from injury  Outcome: PROGRESSING AS EXPECTED  Goal: Will remain free from falls  Outcome: PROGRESSING AS EXPECTED   Pt verbalized understanding of safety education with no further questions at this time. Safety measures in place.

## 2020-09-13 NOTE — PROGRESS NOTES
Received report from GWENDOLYN Marino   Pt sleeping comfortably and soundly in bed   Will continue to monitor and hourly rounding in place

## 2020-09-13 NOTE — DISCHARGE SUMMARY
Discharge Summary    CHIEF COMPLAINT ON ADMISSION  Chief Complaint   Patient presents with   • Abdominal Pain   • Diarrhea   • Nausea   • Bloody Stools       Reason for Admission  Abdominal Pain      CODE STATUS  Full Code    HPI & HOSPITAL COURSE  This is a 60 y.o. female here with known medical history of alcohol abuse, hyperlipidemia and hypertension. Patient was visiting [place] with her  on vacation when she developed severe lower abdominal pain associated with bright red blood per rectum.  Patient described it as cramping in nature and associated with diarrhea. Patient does endorse daily drinking with occasional binging. Average daily drinking is a bottle of wine nightly. Of note patient has an upcoming dental procedure and had been taking approximately 2 days of Augmentin in preparation for this. On presentation to the ER patient's Vital signs were stable, WBC noted to be elevated at 14.3, Ast 48, Alt 66, alk phos 108, lactic acid 2.2, Hgb/hct 14.7/42.7. Patient initially started on Zosyn in the ER accompanied with IV fluid hydration. CT scan of the abdomen was completed which showed severe colitis of the mid transverse through distal sigmoid colon. Patient was then admitted to medical floor for continued antibiotic therapy and IV hydration. Patients IV antibiotics transitioned to Cipro/ Flagyl in the setting of acute colitis. Attempted to check C. Diff, given recent antibiotic usage, however stool contained too much blood and sample was denied by lab. Right upper quadrant ultrasound was completed given patient's significant ETOH history and elevated LFT's which showed Increased liver echogenicity could indicate hepatic steatosis. Patient counseled on need for alcohol cessation. Blood cultures were obtained and remain preliminary negative to date. Covid swab negative. Hgb has remained stable and checked every 8 hours. Patient placed on Lovenox for DVT prophylaxis, given no acute drop in Hgb.   Contacted  by patient's insurance who are requesting transfer to one of their included facilities. Patient is stable at this time and may be transported to another facility. On exam patient continuing to have small amounts of blood per rectum, but states significant lessening of abdominal pain. Patient agrees to transfer which will be set up through case management.        Therefore, she is discharged in good and stable condition to [facility], per insurance.         FOLLOW UP ITEMS POST DISCHARGE  Follow up with [facility]  Will need GI as outpatient     DISCHARGE DIAGNOSES  Principal Problem:    Colitis POA: Unknown  Active Problems:    Transaminitis POA: Unknown    Leukocytosis POA: Unknown    Lactic acidosis POA: Unknown    Alcohol dependence (HCC) POA: Unknown  Resolved Problems:    * No resolved hospital problems. *      FOLLOW UP  With [facility]     MEDICATIONS ON DISCHARGE     Medication List      START taking these medications      Instructions   ciprofloxacin 400 MG/200ML Soln IVPB  Commonly known as: CIPRO   200 mL by Intravenous route every 12 hours.  Dose: 400 mg     enoxaparin 40 MG/0.4ML Soln inj  Start taking on: September 14, 2020  Commonly known as: LOVENOX   Inject 40 mg as instructed every day.  Dose: 40 mg     metroNIDAZOLE 5-0.79 MG/ML-% Soln  Commonly known as: FLAGYL   100 mL by Intravenous route every 8 hours.  Dose: 500 mg     ondansetron 4 MG Tbdp  Commonly known as: ZOFRAN ODT   Take 1 Tab by mouth every four hours as needed for Nausea (give PO if no IV route available).  Dose: 4 mg     ondansetron 4 MG/2ML Soln injection  Commonly known as: ZOFRAN   2 mL by Intravenous route every four hours as needed for Nausea.  Dose: 4 mg     prochlorperazine 5 MG/ML injection  Commonly known as: COMPAZINE   1-2 mL by Intravenous route every four hours as needed (if no relief from ondansetron or promethazine or if not ordered).  Dose: 5-10 mg     * promethazine 12.5 MG  tablet  Commonly known as: PHENERGAN   Take 1-2 Tabs by mouth every four hours as needed for Nausea/Vomiting (if no relief from ondansetron or if ondansetron is not ordered).  Dose: 12.5-25 mg     * promethazine 12.5 MG Supp  Commonly known as: PHENERGAN   Insert 1-2 Suppositories in rectum every four hours as needed for Nausea/Vomiting (if no relief from ondansetron or if ondansetron is not ordered or if unable to tolerate PO).  Dose: 12.5-25 mg     traZODone 50 MG Tabs  Commonly known as: DESYREL   Take 1 Tab by mouth every bedtime.  Dose: 50 mg         * This list has 2 medication(s) that are the same as other medications prescribed for you. Read the directions carefully, and ask your doctor or other care provider to review them with you.            CONTINUE taking these medications      Instructions   lisinopril 10 MG Tabs  Start taking on: September 14, 2020  Commonly known as: PRINIVIL   Take 1 Tab by mouth every day.  Dose: 10 mg        STOP taking these medications    amoxicillin 500 MG Caps  Commonly known as: AMOXIL     dicyclomine 10 MG Caps  Commonly known as: BENTYL     simvastatin 20 MG Tabs  Commonly known as: ZOCOR            Allergies  Not on File    DIET  Orders Placed This Encounter   Procedures   • Diet Order Clear Liquid     Standing Status:   Standing     Number of Occurrences:   1     Order Specific Question:   Diet:     Answer:   Clear Liquid [10]       ACTIVITY  As tolerated.  Weight bearing as tolerated    LINES, DRAINS, AND WOUNDS  This is an automated list. Peripheral IVs will be removed prior to discharge.  Peripheral IV 09/12/20 20 G Left Wrist (Active)   Site Assessment Clean;Dry;Intact 09/12/20 2000   Dressing Type Occlusive;Transparent 09/12/20 2000   Line Status Scrubbed the hub prior to access;Flushed;Infusing 09/12/20 2000   Dressing Status Clean;Dry;Intact 09/12/20 2000   Dressing Intervention N/A 09/12/20 2000   Date Primary Tubing Changed 09/12/20 09/12/20 2000   NEXT Primary  Tubing Change  09/15/20 09/12/20 2000   Infiltration Grading ([facility], [facility]) 0 09/12/20 2000   Phlebitis Scale ([facility] Only) 0 09/12/20 2000       Peripheral IV 09/12/20 20 G Left Antecubital (Active)   Site Assessment Clean;Dry;Intact 09/12/20 2000   Dressing Type Occlusive;Transparent 09/12/20 2000   Line Status Scrubbed the hub prior to access;Saline locked;Lab draw;Flushed;Blood return noted 09/12/20 2000   Dressing Status Clean;Dry;Intact 09/12/20 2000   Dressing Intervention N/A 09/12/20 2000   Infiltration Grading ([facility], [facility]) 0 09/12/20 2000   Phlebitis Scale ([facility] Only) 0 09/12/20 2000          Peripheral IV 09/12/20 20 G Left Wrist (Active)   Site Assessment Clean;Dry;Intact 09/12/20 2000   Dressing Type Occlusive;Transparent 09/12/20 2000   Line Status Scrubbed the hub prior to access;Flushed;Infusing 09/12/20 2000   Dressing Status Clean;Dry;Intact 09/12/20 2000   Dressing Intervention N/A 09/12/20 2000   Date Primary Tubing Changed 09/12/20 09/12/20 2000   NEXT Primary Tubing Change  09/15/20 09/12/20 2000   Infiltration Grading ([facility]) 0 09/12/20 2000   Phlebitis Scale ([facility] Only) 0 09/12/20 2000       Peripheral IV 09/12/20 20 G Left Antecubital (Active)   Site Assessment Clean;Dry;Intact 09/12/20 2000   Dressing Type Occlusive;Transparent 09/12/20 2000   Line Status Scrubbed the hub prior to access;Saline locked;Lab draw;Flushed;Blood return noted 09/12/20 2000   Dressing Status Clean;Dry;Intact 09/12/20 2000   Dressing Intervention N/A 09/12/20 2000   Infiltration Grading ([facility]) 0 09/12/20 2000   Phlebitis Scale ([facility] Only) 0 09/12/20 2000               MENTAL STATUS ON TRANSFER      A&Ox4         CONSULTATIONS  None     PROCEDURES  None     LABORATORY  Lab Results   Component Value Date    SODIUM 138 09/13/2020    POTASSIUM 3.5 (L) 09/13/2020    CHLORIDE 102 09/13/2020    CO2 24 09/13/2020    GLUCOSE 134 (H) 09/13/2020    BUN 6 (L) 09/13/2020    CREATININE 0.57 09/13/2020         Lab Results   Component Value Date    WBC 11.5 (H) 09/13/2020    HEMOGLOBIN 13.2 09/13/2020    HEMATOCRIT 39.2 09/13/2020    PLATELETCT 147 (L) 09/13/2020        Total time of the discharge process exceeds 38 minutes.

## 2020-09-13 NOTE — PROGRESS NOTES
Stool sample sent down   - Unable to process C-Diff due to active bleed   - MD is aware, staff educated to notify RN once stool brown & Liquid

## 2020-09-14 LAB
ALBUMIN SERPL BCP-MCNC: 3.3 G/DL (ref 3.2–4.9)
ALBUMIN/GLOB SERPL: 1.3 G/DL
ALP SERPL-CCNC: 71 U/L (ref 30–99)
ALT SERPL-CCNC: 25 U/L (ref 2–50)
ANION GAP SERPL CALC-SCNC: 12 MMOL/L (ref 7–16)
AST SERPL-CCNC: 15 U/L (ref 12–45)
BASOPHILS # BLD AUTO: 0.8 % (ref 0–1.8)
BASOPHILS # BLD: 0.07 K/UL (ref 0–0.12)
BILIRUB SERPL-MCNC: 0.5 MG/DL (ref 0.1–1.5)
BUN SERPL-MCNC: 5 MG/DL (ref 8–22)
CALCIUM SERPL-MCNC: 8.3 MG/DL (ref 8.4–10.2)
CHLORIDE SERPL-SCNC: 106 MMOL/L (ref 96–112)
CO2 SERPL-SCNC: 21 MMOL/L (ref 20–33)
CREAT SERPL-MCNC: 0.51 MG/DL (ref 0.5–1.4)
EOSINOPHIL # BLD AUTO: 0.07 K/UL (ref 0–0.51)
EOSINOPHIL NFR BLD: 0.8 % (ref 0–6.9)
ERYTHROCYTE [DISTWIDTH] IN BLOOD BY AUTOMATED COUNT: 41.8 FL (ref 35.9–50)
GLOBULIN SER CALC-MCNC: 2.5 G/DL (ref 1.9–3.5)
GLUCOSE SERPL-MCNC: 117 MG/DL (ref 65–99)
HCT VFR BLD AUTO: 35.2 % (ref 37–47)
HGB BLD-MCNC: 11.5 G/DL (ref 12–16)
IMM GRANULOCYTES # BLD AUTO: 0.03 K/UL (ref 0–0.11)
IMM GRANULOCYTES NFR BLD AUTO: 0.3 % (ref 0–0.9)
LYMPHOCYTES # BLD AUTO: 1.01 K/UL (ref 1–4.8)
LYMPHOCYTES NFR BLD: 11.2 % (ref 22–41)
MCH RBC QN AUTO: 30.4 PG (ref 27–33)
MCHC RBC AUTO-ENTMCNC: 32.7 G/DL (ref 33.6–35)
MCV RBC AUTO: 93.1 FL (ref 81.4–97.8)
MONOCYTES # BLD AUTO: 1.04 K/UL (ref 0–0.85)
MONOCYTES NFR BLD AUTO: 11.5 % (ref 0–13.4)
NEUTROPHILS # BLD AUTO: 6.8 K/UL (ref 2–7.15)
NEUTROPHILS NFR BLD: 75.4 % (ref 44–72)
NRBC # BLD AUTO: 0 K/UL
NRBC BLD-RTO: 0 /100 WBC
PLATELET # BLD AUTO: 128 K/UL (ref 164–446)
PMV BLD AUTO: 10.5 FL (ref 9–12.9)
POTASSIUM SERPL-SCNC: 3.6 MMOL/L (ref 3.6–5.5)
PROT SERPL-MCNC: 5.8 G/DL (ref 6–8.2)
RBC # BLD AUTO: 3.78 M/UL (ref 4.2–5.4)
SODIUM SERPL-SCNC: 139 MMOL/L (ref 135–145)
WBC # BLD AUTO: 9 K/UL (ref 4.8–10.8)

## 2020-09-14 PROCEDURE — 700102 HCHG RX REV CODE 250 W/ 637 OVERRIDE(OP): Performed by: HOSPITALIST

## 2020-09-14 PROCEDURE — 700102 HCHG RX REV CODE 250 W/ 637 OVERRIDE(OP): Performed by: INTERNAL MEDICINE

## 2020-09-14 PROCEDURE — 85025 COMPLETE CBC W/AUTO DIFF WBC: CPT

## 2020-09-14 PROCEDURE — 700105 HCHG RX REV CODE 258: Performed by: INTERNAL MEDICINE

## 2020-09-14 PROCEDURE — 700111 HCHG RX REV CODE 636 W/ 250 OVERRIDE (IP): Performed by: INTERNAL MEDICINE

## 2020-09-14 PROCEDURE — 700101 HCHG RX REV CODE 250: Performed by: INTERNAL MEDICINE

## 2020-09-14 PROCEDURE — 80053 COMPREHEN METABOLIC PANEL: CPT

## 2020-09-14 PROCEDURE — 700111 HCHG RX REV CODE 636 W/ 250 OVERRIDE (IP): Performed by: NURSE PRACTITIONER

## 2020-09-14 PROCEDURE — A9270 NON-COVERED ITEM OR SERVICE: HCPCS | Performed by: HOSPITALIST

## 2020-09-14 PROCEDURE — 770021 HCHG ROOM/CARE - ISO PRIVATE

## 2020-09-14 PROCEDURE — A9270 NON-COVERED ITEM OR SERVICE: HCPCS | Performed by: INTERNAL MEDICINE

## 2020-09-14 PROCEDURE — 99232 SBSQ HOSP IP/OBS MODERATE 35: CPT | Performed by: INTERNAL MEDICINE

## 2020-09-14 RX ORDER — ACETAMINOPHEN 325 MG/1
650 TABLET ORAL EVERY 4 HOURS PRN
Status: DISCONTINUED | OUTPATIENT
Start: 2020-09-14 | End: 2020-09-15

## 2020-09-14 RX ORDER — MORPHINE SULFATE 4 MG/ML
2 INJECTION, SOLUTION INTRAMUSCULAR; INTRAVENOUS EVERY 4 HOURS PRN
Status: DISCONTINUED | OUTPATIENT
Start: 2020-09-14 | End: 2020-09-15

## 2020-09-14 RX ADMIN — MORPHINE SULFATE 2 MG: 4 INJECTION INTRAVENOUS at 16:12

## 2020-09-14 RX ADMIN — SODIUM CHLORIDE: 9 INJECTION, SOLUTION INTRAVENOUS at 21:19

## 2020-09-14 RX ADMIN — METRONIDAZOLE 500 MG: 500 INJECTION, SOLUTION INTRAVENOUS at 21:19

## 2020-09-14 RX ADMIN — CIPROFLOXACIN 400 MG: 2 INJECTION, SOLUTION INTRAVENOUS at 06:43

## 2020-09-14 RX ADMIN — TRAZODONE HYDROCHLORIDE 50 MG: 50 TABLET ORAL at 21:19

## 2020-09-14 RX ADMIN — LISINOPRIL 10 MG: 5 TABLET ORAL at 05:42

## 2020-09-14 RX ADMIN — SODIUM CHLORIDE: 9 INJECTION, SOLUTION INTRAVENOUS at 05:00

## 2020-09-14 RX ADMIN — ACETAMINOPHEN 650 MG: 325 TABLET, FILM COATED ORAL at 13:18

## 2020-09-14 RX ADMIN — METRONIDAZOLE 500 MG: 500 INJECTION, SOLUTION INTRAVENOUS at 13:32

## 2020-09-14 RX ADMIN — CIPROFLOXACIN 400 MG: 2 INJECTION, SOLUTION INTRAVENOUS at 17:20

## 2020-09-14 RX ADMIN — METRONIDAZOLE 500 MG: 500 INJECTION, SOLUTION INTRAVENOUS at 05:42

## 2020-09-14 ASSESSMENT — LIFESTYLE VARIABLES: SUBSTANCE_ABUSE: 1

## 2020-09-14 ASSESSMENT — ENCOUNTER SYMPTOMS
EYE PAIN: 0
FALLS: 0
BLOOD IN STOOL: 1
HEADACHES: 0
VOMITING: 0
DEPRESSION: 0
ABDOMINAL PAIN: 1
SHORTNESS OF BREATH: 0
DIZZINESS: 0
CHILLS: 0
FEVER: 0
MYALGIAS: 0
COUGH: 0
SORE THROAT: 0
EYE DISCHARGE: 0
NAUSEA: 0
DIARRHEA: 0

## 2020-09-14 ASSESSMENT — PAIN DESCRIPTION - PAIN TYPE
TYPE: ACUTE PAIN
TYPE: ACUTE PAIN

## 2020-09-14 ASSESSMENT — FIBROSIS 4 INDEX: FIB4 SCORE: 1.49

## 2020-09-14 NOTE — CARE PLAN
Problem: Communication  Goal: The ability to communicate needs accurately and effectively will improve  Outcome: PROGRESSING AS EXPECTED  Updated pt whiteboard and discussed POC. Answered all questions. Pt verbalized understanding of teaching.       Problem: Discharge Barriers/Planning  Goal: Patient's continuum of care needs will be met  Outcome: PROGRESSING AS EXPECTED  Provided education to pt about potential discharge barriers. Pt verbalized understanding of teaching.

## 2020-09-14 NOTE — PROGRESS NOTES
Hospital Medicine Daily Progress Note    Date of Service  9/14/2020    Chief Complaint  60 y.o. female admitted 9/12/2020 with severe abdominal pain and bright red blood per rectum.    Hospital Course    This is a 60 y.o. female here with known medical history of alcohol abuse, hyperlipidemia and hypertension. Patient was visiting [place] with her  on vacation when she developed severe lower abdominal pain associated with bright red blood per rectum.  Patient described it as cramping in nature and associated with diarrhea. Patient does endorse daily drinking with occasional binging. Average daily drinking is a bottle of wine nightly. Of note patient has an upcoming dental procedure and had been taking approximately 2 days of Augmentin in preparation for this. On presentation to the ER patient's Vital signs were stable, WBC noted to be elevated at 14.3, Ast 48, Alt 66, alk phos 108, lactic acid 2.2, Hgb/hct 14.7/42.7. Patient initially started on Zosyn in the ER accompanied with IV fluid hydration. CT scan of the abdomen was completed which showed severe colitis of the mid transverse through distal sigmoid colon. Patient was then admitted to medical floor for continued antibiotic therapy and IV hydration. Patients IV antibiotics transitioned to Cipro/ Flagyl in the setting of acute colitis. Attempted to check C. Diff, given recent antibiotic usage, however stool contained too much blood and sample was denied by lab. Right upper quadrant ultrasound was completed given patient's significant ETOH history and elevated LFT's which showed Increased liver echogenicity could indicate hepatic steatosis. Patient counseled on need for alcohol cessation. Blood cultures were obtained and remain preliminary negative to date. Covid swab negative. Hgb has remained stable and checked every 8 hours.        Interval Problem Update  9/13- Contacted by patient's insurance who are requesting transfer to one of their included  facilities. Patient is stable at this time and may be transported to another facility. On exam patient continuing to have small amounts of blood per rectum, but states significant lessening of abdominal pain. Insurance has declined transfer today and will re-evaluate tomorrow.  Patient continue with IV antibiotics/IV fluids and supportive management.  9/14- Patient states she is feeling improved and stool has decreased, but still having 1/4 cup amount of blood with clots when she uses bathroom. [facility] has decided they will not be transferring patient at this time. Likely will be able to discharge tomorrow on oral antibiotics, if continues to improve. Small decrease in H&H but patient is receiving IV fluids so may be dilutional.     Consultants/Specialty  None    Code Status  Full Code    Disposition  TBD- Possible discharge tomorrow on oral abx if improves. [facility] has canceled transfer     Review of Systems  Review of Systems   Constitutional: Negative for chills, fever and malaise/fatigue.   HENT: Negative for congestion and sore throat.    Eyes: Negative for pain and discharge.   Respiratory: Negative for cough and shortness of breath.    Cardiovascular: Negative for chest pain and leg swelling.   Gastrointestinal: Positive for abdominal pain (improved ) and blood in stool. Negative for diarrhea, nausea and vomiting.   Genitourinary: Negative for dysuria, frequency and urgency.   Musculoskeletal: Negative for falls and myalgias.   Skin: Negative for rash.   Neurological: Negative for dizziness and headaches.   Psychiatric/Behavioral: Positive for substance abuse. Negative for depression.        Physical Exam  Temp:  [37 °C (98.6 °F)-37.4 °C (99.3 °F)] 37 °C (98.6 °F)  Pulse:  [62-72] 66  Resp:  [18-19] 18  BP: (112-133)/(57-79) 127/79  SpO2:  [92 %-95 %] 95 %    Physical Exam  Vitals signs and nursing note reviewed.   Constitutional:       General: She is awake.      Appearance: She is overweight. She is not  ill-appearing.   HENT:      Head: Normocephalic and atraumatic.      Mouth/Throat:      Lips: Pink.      Mouth: Mucous membranes are moist.   Eyes:      General: Lids are normal.   Cardiovascular:      Rate and Rhythm: Normal rate.      Heart sounds: Normal heart sounds. No friction rub.   Pulmonary:      Effort: Pulmonary effort is normal. No respiratory distress.      Breath sounds: Normal breath sounds. No decreased breath sounds or wheezing.   Abdominal:      General: Bowel sounds are normal.      Palpations: Abdomen is soft.      Tenderness: There is generalized abdominal tenderness and tenderness in the right lower quadrant and left lower quadrant. There is no rebound.   Musculoskeletal:      Comments: Moves all extremities   Skin:     General: Skin is warm and dry.   Neurological:      General: No focal deficit present.      Mental Status: She is alert and oriented to person, place, and time. Mental status is at baseline.   Psychiatric:         Attention and Perception: Attention normal.         Mood and Affect: Mood normal.         Speech: Speech normal.         Behavior: Behavior is cooperative.         Cognition and Memory: Cognition normal.     exam completed and unchanged from previous on 9/13/20    Fluids    Intake/Output Summary (Last 24 hours) at 9/14/2020 1124  Last data filed at 9/14/2020 0500  Gross per 24 hour   Intake 2701.67 ml   Output --   Net 2701.67 ml       Laboratory  Recent Labs     09/12/20  0525  09/13/20  0232 09/13/20  0952 09/13/20  2144 09/14/20  0445   WBC 14.3*  --  11.5*  --   --  9.0   RBC 4.81  --  4.33  --   --  3.78*   HEMOGLOBIN 14.7   < > 13.2 12.7 12.3 11.5*   HEMATOCRIT 42.7  --  39.2  --   --  35.2*   MCV 88.8  --  90.5  --   --  93.1   MCH 30.6  --  30.5  --   --  30.4   MCHC 34.4  --  33.7  --   --  32.7*   RDW 38.4  --  40.4  --   --  41.8   PLATELETCT 163*  --  147*  --   --  128*   MPV 10.8  --  11.5  --   --  10.5    < > = values in this interval not displayed.      Recent Labs     09/12/20  0517 09/13/20  0232 09/14/20  0445   SODIUM 136 138 139   POTASSIUM 4.2 3.5* 3.6   CHLORIDE 97 102 106   CO2 23 24 21   GLUCOSE 146* 134* 117*   BUN 15 6* 5*   CREATININE 0.51 0.57 0.51   CALCIUM 9.4 9.0 8.3*     Recent Labs     09/12/20  0517   APTT 22.1*   INR 1.02               Imaging  US-RUQ   Final Result      1.  Increased liver echogenicity could indicate hepatic steatosis.      2.  Exam is otherwise within normal limits.      CT-ABDOMEN-PELVIS WITH   Final Result         1.  Severe colitis of the mid transverse through the distal sigmoid colon, consider infectious, inflammatory, or ischemic etiologies. Differential could include intramural hematoma in the appropriate clinical setting however density of bowel wall is less    than would be typically expected for hematoma.   2.  Hepatomegaly and diffuse hepatic steatosis           Assessment/Plan  * Colitis  Assessment & Plan  Seen on imaging.  I suspect infectious versus ischemic etiology and less likely inflammatory bowel disease given no personal or family history.  Check stool culture, fecal WBCs  Fecal calprotectin  Check C. Difficile when able  Empiric coverage with Cipro Flagyl in the setting of acute colitis  IVF, avoid hypotension    Alcohol dependence (HCC)  Assessment & Plan  Drinks 4 glasses of wine nightly, denies history of withdrawal in the past  Monitor for evidence of withdrawal  Initiate CIWA protocol if she develops tremors, diaphoresis  Alcohol cessation discussed    Leukocytosis  Assessment & Plan  Likely related to colitis seen on imaging.  She has hematochezia and diarrhea.  She recently took Augmentin however only for a few days due to to prophylaxis for dental procedure.  Check C. Difficile-when able, lab declined previous sample due to blood with stool  IV fluids  Empiric antibiotics with Cipro and Flagyl    Transaminitis  Assessment & Plan  She drinks 4 glasses of wine daily, denies right upper quadrant  pain, rather pain is localized to bilateral lower quadrants.  RUQ US Increased liver echogenicity could indicate hepatic steatosis.  For now I will hold her statin  Repeat LFTs in a.m. show improvement continue to monitor       VTE prophylaxis: SCD

## 2020-09-14 NOTE — PROGRESS NOTES
Received bedside patient report from GWENDOLYN López. Patient resting comfortably in bed, no complaints at this time. Safety precautions in place. Special Contact Isolation Precautions in place. Will continue to monitor.

## 2020-09-14 NOTE — PROGRESS NOTES
Patient with a total of 3 bloody bowel movements today, all small in quantity. Patient continues to endorse abdominal cramping and intermittent nausea. PRN compazine administered.

## 2020-09-14 NOTE — PROGRESS NOTES
Bedside report give to GWENDOLYN Blevins. POC discussed. Patient resting comfortably in bed. Care released

## 2020-09-14 NOTE — DISCHARGE PLANNING
JUSTINEW spoke with [facility] April. Per April, per pts chart and report from bedside RN pt not stable enough for transfer. Pt to stay at [facility].

## 2020-09-15 ENCOUNTER — PATIENT OUTREACH (OUTPATIENT)
Dept: HEALTH INFORMATION MANAGEMENT | Facility: OTHER | Age: 60
End: 2020-09-15

## 2020-09-15 ENCOUNTER — APPOINTMENT (OUTPATIENT)
Dept: RADIOLOGY | Facility: MEDICAL CENTER | Age: 60
DRG: 392 | End: 2020-09-15
Attending: NURSE PRACTITIONER
Payer: COMMERCIAL

## 2020-09-15 PROBLEM — E87.6 HYPOKALEMIA: Status: ACTIVE | Noted: 2020-09-15

## 2020-09-15 LAB
ALBUMIN SERPL BCP-MCNC: 3.3 G/DL (ref 3.2–4.9)
ALBUMIN/GLOB SERPL: 1.4 G/DL
ALP SERPL-CCNC: 88 U/L (ref 30–99)
ALT SERPL-CCNC: 21 U/L (ref 2–50)
ANION GAP SERPL CALC-SCNC: 11 MMOL/L (ref 7–16)
AST SERPL-CCNC: 16 U/L (ref 12–45)
BASOPHILS # BLD AUTO: 0.7 % (ref 0–1.8)
BASOPHILS # BLD: 0.06 K/UL (ref 0–0.12)
BILIRUB SERPL-MCNC: 0.4 MG/DL (ref 0.1–1.5)
BUN SERPL-MCNC: 4 MG/DL (ref 8–22)
CALCIUM SERPL-MCNC: 8.2 MG/DL (ref 8.4–10.2)
CHLORIDE SERPL-SCNC: 103 MMOL/L (ref 96–112)
CO2 SERPL-SCNC: 25 MMOL/L (ref 20–33)
CREAT SERPL-MCNC: 0.46 MG/DL (ref 0.5–1.4)
EOSINOPHIL # BLD AUTO: 0.17 K/UL (ref 0–0.51)
EOSINOPHIL NFR BLD: 2 % (ref 0–6.9)
ERYTHROCYTE [DISTWIDTH] IN BLOOD BY AUTOMATED COUNT: 40.2 FL (ref 35.9–50)
GLOBULIN SER CALC-MCNC: 2.4 G/DL (ref 1.9–3.5)
GLUCOSE SERPL-MCNC: 100 MG/DL (ref 65–99)
HCT VFR BLD AUTO: 34.3 % (ref 37–47)
HGB BLD-MCNC: 11.6 G/DL (ref 12–16)
IMM GRANULOCYTES # BLD AUTO: 0.04 K/UL (ref 0–0.11)
IMM GRANULOCYTES NFR BLD AUTO: 0.5 % (ref 0–0.9)
LYMPHOCYTES # BLD AUTO: 1.53 K/UL (ref 1–4.8)
LYMPHOCYTES NFR BLD: 18 % (ref 22–41)
MCH RBC QN AUTO: 30.8 PG (ref 27–33)
MCHC RBC AUTO-ENTMCNC: 33.8 G/DL (ref 33.6–35)
MCV RBC AUTO: 91 FL (ref 81.4–97.8)
MONOCYTES # BLD AUTO: 1.02 K/UL (ref 0–0.85)
MONOCYTES NFR BLD AUTO: 12 % (ref 0–13.4)
NEUTROPHILS # BLD AUTO: 5.66 K/UL (ref 2–7.15)
NEUTROPHILS NFR BLD: 66.8 % (ref 44–72)
NRBC # BLD AUTO: 0 K/UL
NRBC BLD-RTO: 0 /100 WBC
PLATELET # BLD AUTO: 153 K/UL (ref 164–446)
PMV BLD AUTO: 11.4 FL (ref 9–12.9)
POTASSIUM SERPL-SCNC: 3.4 MMOL/L (ref 3.6–5.5)
PROT SERPL-MCNC: 5.7 G/DL (ref 6–8.2)
RBC # BLD AUTO: 3.77 M/UL (ref 4.2–5.4)
SODIUM SERPL-SCNC: 139 MMOL/L (ref 135–145)
WBC # BLD AUTO: 8.5 K/UL (ref 4.8–10.8)

## 2020-09-15 PROCEDURE — 700102 HCHG RX REV CODE 250 W/ 637 OVERRIDE(OP): Performed by: NURSE PRACTITIONER

## 2020-09-15 PROCEDURE — 74018 RADEX ABDOMEN 1 VIEW: CPT

## 2020-09-15 PROCEDURE — A9270 NON-COVERED ITEM OR SERVICE: HCPCS | Performed by: NURSE PRACTITIONER

## 2020-09-15 PROCEDURE — 700102 HCHG RX REV CODE 250 W/ 637 OVERRIDE(OP): Performed by: INTERNAL MEDICINE

## 2020-09-15 PROCEDURE — 700111 HCHG RX REV CODE 636 W/ 250 OVERRIDE (IP): Performed by: INTERNAL MEDICINE

## 2020-09-15 PROCEDURE — 700101 HCHG RX REV CODE 250: Performed by: INTERNAL MEDICINE

## 2020-09-15 PROCEDURE — 99233 SBSQ HOSP IP/OBS HIGH 50: CPT | Performed by: HOSPITALIST

## 2020-09-15 PROCEDURE — 770021 HCHG ROOM/CARE - ISO PRIVATE

## 2020-09-15 PROCEDURE — 700102 HCHG RX REV CODE 250 W/ 637 OVERRIDE(OP): Performed by: HOSPITALIST

## 2020-09-15 PROCEDURE — A9270 NON-COVERED ITEM OR SERVICE: HCPCS | Performed by: HOSPITALIST

## 2020-09-15 PROCEDURE — 700111 HCHG RX REV CODE 636 W/ 250 OVERRIDE (IP): Performed by: NURSE PRACTITIONER

## 2020-09-15 PROCEDURE — 85025 COMPLETE CBC W/AUTO DIFF WBC: CPT

## 2020-09-15 PROCEDURE — A9270 NON-COVERED ITEM OR SERVICE: HCPCS | Performed by: INTERNAL MEDICINE

## 2020-09-15 PROCEDURE — 80053 COMPREHEN METABOLIC PANEL: CPT

## 2020-09-15 RX ORDER — POTASSIUM CHLORIDE 20 MEQ/1
40 TABLET, EXTENDED RELEASE ORAL DAILY
Status: DISCONTINUED | OUTPATIENT
Start: 2020-09-15 | End: 2020-09-16 | Stop reason: HOSPADM

## 2020-09-15 RX ORDER — FOLIC ACID 1 MG/1
1 TABLET ORAL DAILY
Status: DISCONTINUED | OUTPATIENT
Start: 2020-09-15 | End: 2020-09-16 | Stop reason: HOSPADM

## 2020-09-15 RX ORDER — ACETAMINOPHEN 500 MG
500 TABLET ORAL EVERY 4 HOURS
Status: DISCONTINUED | OUTPATIENT
Start: 2020-09-15 | End: 2020-09-16 | Stop reason: HOSPADM

## 2020-09-15 RX ORDER — THIAMINE MONONITRATE (VIT B1) 100 MG
100 TABLET ORAL DAILY
Status: DISCONTINUED | OUTPATIENT
Start: 2020-09-15 | End: 2020-09-16 | Stop reason: HOSPADM

## 2020-09-15 RX ORDER — OXYCODONE HYDROCHLORIDE 5 MG/1
5 TABLET ORAL EVERY 4 HOURS PRN
Status: DISCONTINUED | OUTPATIENT
Start: 2020-09-15 | End: 2020-09-15

## 2020-09-15 RX ADMIN — MORPHINE SULFATE 2 MG: 4 INJECTION INTRAVENOUS at 01:15

## 2020-09-15 RX ADMIN — LISINOPRIL 10 MG: 5 TABLET ORAL at 05:26

## 2020-09-15 RX ADMIN — PROMETHAZINE HYDROCHLORIDE 25 MG: 25 TABLET ORAL at 05:28

## 2020-09-15 RX ADMIN — CIPROFLOXACIN 400 MG: 2 INJECTION, SOLUTION INTRAVENOUS at 06:26

## 2020-09-15 RX ADMIN — ACETAMINOPHEN 500 MG: 500 TABLET, FILM COATED ORAL at 21:10

## 2020-09-15 RX ADMIN — ACETAMINOPHEN 500 MG: 500 TABLET, FILM COATED ORAL at 17:59

## 2020-09-15 RX ADMIN — CIPROFLOXACIN 400 MG: 2 INJECTION, SOLUTION INTRAVENOUS at 17:59

## 2020-09-15 RX ADMIN — FOLIC ACID 1 MG: 1 TABLET ORAL at 14:54

## 2020-09-15 RX ADMIN — THERA TABS 1 TABLET: TAB at 14:54

## 2020-09-15 RX ADMIN — TRAZODONE HYDROCHLORIDE 50 MG: 50 TABLET ORAL at 21:10

## 2020-09-15 RX ADMIN — ACETAMINOPHEN 500 MG: 500 TABLET, FILM COATED ORAL at 10:53

## 2020-09-15 RX ADMIN — METRONIDAZOLE 500 MG: 500 INJECTION, SOLUTION INTRAVENOUS at 05:26

## 2020-09-15 RX ADMIN — METRONIDAZOLE 500 MG: 500 INJECTION, SOLUTION INTRAVENOUS at 14:57

## 2020-09-15 RX ADMIN — METRONIDAZOLE 500 MG: 500 INJECTION, SOLUTION INTRAVENOUS at 21:14

## 2020-09-15 RX ADMIN — Medication 100 MG: at 14:53

## 2020-09-15 RX ADMIN — ACETAMINOPHEN 500 MG: 500 TABLET, FILM COATED ORAL at 14:54

## 2020-09-15 RX ADMIN — POTASSIUM CHLORIDE 40 MEQ: 20 TABLET, EXTENDED RELEASE ORAL at 14:53

## 2020-09-15 ASSESSMENT — ENCOUNTER SYMPTOMS
FALLS: 0
ABDOMINAL PAIN: 1
CHILLS: 0
SHORTNESS OF BREATH: 0
NAUSEA: 0
FEVER: 0
VOMITING: 0
COUGH: 0
SORE THROAT: 0
EYE PAIN: 0
HEADACHES: 0
BLOOD IN STOOL: 1
DIARRHEA: 0
EYE DISCHARGE: 0
MYALGIAS: 0
DIZZINESS: 0
DEPRESSION: 0

## 2020-09-15 ASSESSMENT — PAIN DESCRIPTION - PAIN TYPE
TYPE: ACUTE PAIN
TYPE: ACUTE PAIN

## 2020-09-15 ASSESSMENT — LIFESTYLE VARIABLES: SUBSTANCE_ABUSE: 1

## 2020-09-15 NOTE — PROGRESS NOTES
Received bedside patient report from GWENDOLYN Blevins. Patient resting comfortably in bed, no complaints at this time. Safety precautions in place. Special Contact Isolation precautions in place. Will continue to monitor.

## 2020-09-15 NOTE — PROGRESS NOTES
Hospital Medicine Daily Progress Note    Date of Service  9/15/2020    Chief Complaint  Abdominal pain    Hospital Course   Ms. Stewart is a 60-year-old female with PMH EtOH dependence, HLD and HTN who presented 9/12/2020 with abdominal pain and bright red blood per rectum.  Patient is visiting [place] with her  on vacation when she developed sudden onset severe lower abdominal pain with associated BRBPR.  Patient did endorse daily drinking with occasional binging.  Average daily EtOH intake is 1 bottle of wine. Of note, patient has an upcoming dental procedure and has been taking Augmentin.     On presentation she was hemodynamically stable with leukocytosis, WBC 14.3 and elevated liver enzymes. US RUQ showed increased liver echogenicity consistent with hepatic steatosis, otherwise unremarkable.     She was treated with IV fluid hydration and IV ABX in the setting of acute colitis. Attempted to check C. difficile, however still contain too much blood and sample was denied by lab.    Hemoglobins were trended throughout her hospitalization and remained stable, >11.5.      Interval Problem Update  -Ongoing abdominal pain.  Unable to tolerate diet.  All narcotics have been DC'd.  Scheduled Tylenol.  Patient in agreement with this plan  -Ongoing bloody stools.  Hemodynamically stable.  Hgb stable this morning  -Abdomen reportedly slightly more distended than yesterday.  KUB this morning unremarkable    Consultants/Specialty  None    Code Status  Full Code    Disposition  DC home in 1 to 2 days once able to tolerate diet    Review of Systems  Review of Systems   Constitutional: Negative for chills, fever and malaise/fatigue.   HENT: Negative for congestion and sore throat.    Eyes: Negative for pain and discharge.   Respiratory: Negative for cough and shortness of breath.    Cardiovascular: Negative for chest pain and leg swelling.   Gastrointestinal: Positive for abdominal pain and blood in stool. Negative for  diarrhea, nausea and vomiting.   Genitourinary: Negative for dysuria, frequency and urgency.   Musculoskeletal: Negative for falls and myalgias.   Skin: Negative for rash.   Neurological: Negative for dizziness and headaches.   Psychiatric/Behavioral: Positive for substance abuse. Negative for depression.   All other systems reviewed and are negative.       Physical Exam  Temp:  [37 °C (98.6 °F)-37.2 °C (99 °F)] 37.2 °C (99 °F)  Pulse:  [60-63] 60  Resp:  [16-18] 18  BP: (140-145)/(70-80) 144/80  SpO2:  [95 %-98 %] 95 %    Physical Exam  Vitals signs and nursing note reviewed.   Constitutional:       General: She is awake.      Appearance: She is overweight. She is not ill-appearing or toxic-appearing.   HENT:      Head: Normocephalic and atraumatic.      Mouth/Throat:      Lips: Pink.      Mouth: Mucous membranes are moist.   Eyes:      General: Lids are normal.   Cardiovascular:      Rate and Rhythm: Normal rate.      Heart sounds: Normal heart sounds. No friction rub.   Pulmonary:      Effort: Pulmonary effort is normal. No respiratory distress.      Breath sounds: Normal breath sounds. No decreased breath sounds or wheezing.   Abdominal:      General: Bowel sounds are normal.      Palpations: Abdomen is soft.      Tenderness: There is generalized abdominal tenderness and tenderness in the right lower quadrant and left lower quadrant. There is no guarding or rebound.   Musculoskeletal:      Comments: Moves all extremities 5/5   Skin:     General: Skin is warm and dry.   Neurological:      General: No focal deficit present.      Mental Status: She is alert and oriented to person, place, and time. Mental status is at baseline.      Motor: Motor function is intact.      Coordination: Coordination is intact.   Psychiatric:         Attention and Perception: Attention normal.         Mood and Affect: Mood normal.         Speech: Speech normal.         Behavior: Behavior is cooperative.         Cognition and Memory:  Cognition normal.         Judgment: Judgment normal.         Fluids    Intake/Output Summary (Last 24 hours) at 9/15/2020 1402  Last data filed at 9/15/2020 0930  Gross per 24 hour   Intake 1000 ml   Output 200 ml   Net 800 ml       Laboratory  Recent Labs     09/13/20 0232 09/13/20  2144 09/14/20  0445 09/15/20  0437   WBC 11.5*  --   --  9.0 8.5   RBC 4.33  --   --  3.78* 3.77*   HEMOGLOBIN 13.2   < > 12.3 11.5* 11.6*   HEMATOCRIT 39.2  --   --  35.2* 34.3*   MCV 90.5  --   --  93.1 91.0   MCH 30.5  --   --  30.4 30.8   MCHC 33.7  --   --  32.7* 33.8   RDW 40.4  --   --  41.8 40.2   PLATELETCT 147*  --   --  128* 153*   MPV 11.5  --   --  10.5 11.4    < > = values in this interval not displayed.     Recent Labs     09/13/20  0232 09/14/20  0445 09/15/20  0437   SODIUM 138 139 139   POTASSIUM 3.5* 3.6 3.4*   CHLORIDE 102 106 103   CO2 24 21 25   GLUCOSE 134* 117* 100*   BUN 6* 5* 4*   CREATININE 0.57 0.51 0.46*   CALCIUM 9.0 8.3* 8.2*                   Imaging  [id number] VIEW   Final Result      No supine evidence of acute abdomen/pelvis abnormality.      US-RUQ   Final Result      1.  Increased liver echogenicity could indicate hepatic steatosis.      2.  Exam is otherwise within normal limits.      CT-ABDOMEN-PELVIS WITH   Final Result         1.  Severe colitis of the mid transverse through the distal sigmoid colon, consider infectious, inflammatory, or ischemic etiologies. Differential could include intramural hematoma in the appropriate clinical setting however density of bowel wall is less    than would be typically expected for hematoma.   2.  Hepatomegaly and diffuse hepatic steatosis           Assessment/Plan  * Colitis- (present on admission)  Assessment & Plan  -Noted on imaging  -Was on ABX 2 days prior to admission.  Unable to get C. difficile due to bloody stools  -Ongoing bloody stools.  Hgb stable  -Continue Flagyl and Cipro  -Ongoing abdominal pain.  KUB today showed no acute findings  -Clear  liquid diet.  ADAT  -Follow-up with PCP in [place]      Hypokalemia  Assessment & Plan  -Due to ongoing stools  -Replacing  -Follow BMP    Alcohol dependence (HCC)- (present on admission)  Assessment & Plan  -Admits to 1 bottle of wine nightly with occasional binge drinking  -No evidence of acute alcohol withdrawal  -Initiate CIWA protocol as clinically indicated  -Seizure precautions  -Daily MVI, folate and thiamine      Leukocytosis  Assessment & Plan  -Resolved      Transaminitis  Assessment & Plan  -Likely due to EtOH dependence  -Elevated liver enzymes.  RUQ US showed increased liver echogenicity consistent with hepatic steatosis  -Holding statin for now  -Ongoing education and counseling regarding alcohol abstinence  -Follow-up with outpatient PCP in [place]         VTE prophylaxis: Ambulation and SCD    I have performed a physical exam and reviewed and updated ROS and Assessment/Plan today (09/15/20). In review of the previous note there are no changes except as documented above    Please note that this dictation was created using voice recognition software. I have made every reasonable attempt to correct obvious errors, but there may be errors of grammar and possibly content that I did not discover before finalizing the note.    AMADO Villalta.

## 2020-09-15 NOTE — CARE PLAN
Problem: Knowledge Deficit  Goal: Knowledge of disease process/condition, treatment plan, diagnostic tests, and medications will improve  Outcome: PROGRESSING AS EXPECTED  Discussed plan of care with patient including pain medications ordered, diet order and discontinuing of IV fluids. Allowed time for questions, patient agreed and verbalized understanding.     Problem: Pain Management  Goal: Pain level will decrease to patient's comfort goal  Outcome: PROGRESSING SLOWER THAN EXPECTED  Patient continues to have pain, extended release tylenol has been added.

## 2020-09-15 NOTE — PROGRESS NOTES
0720 Report received from Nica SNOW. Plan of care discussed. Patient resting comfortably in bed, respirations are even and unlabored. Safety precautions in place.     0930 Assessment completed, patient is alert and oriented x 4, patient states she is having 5/10 abdominal cramping pain but does not want narcotics ordered. ANGELITO Capellan updated and will order different pain medication. Safety precautions in place.     1045 Medicated patient per MAR, patient states her pain is 6/10, cold pack provided per patient's request.     1100 ANGELITO Capellan rounded at bedside.

## 2020-09-16 VITALS
HEIGHT: 65 IN | HEART RATE: 61 BPM | OXYGEN SATURATION: 94 % | TEMPERATURE: 98.6 F | SYSTOLIC BLOOD PRESSURE: 154 MMHG | DIASTOLIC BLOOD PRESSURE: 80 MMHG | WEIGHT: 185.85 LBS | RESPIRATION RATE: 18 BRPM | BODY MASS INDEX: 30.96 KG/M2

## 2020-09-16 PROBLEM — E87.6 HYPOKALEMIA: Status: RESOLVED | Noted: 2020-09-15 | Resolved: 2020-09-16

## 2020-09-16 PROBLEM — D72.829 LEUKOCYTOSIS: Status: RESOLVED | Noted: 2020-09-12 | Resolved: 2020-09-16

## 2020-09-16 PROBLEM — R74.01 TRANSAMINITIS: Status: RESOLVED | Noted: 2020-09-12 | Resolved: 2020-09-16

## 2020-09-16 LAB
ALBUMIN SERPL BCP-MCNC: 3.5 G/DL (ref 3.2–4.9)
ALBUMIN/GLOB SERPL: 1.3 G/DL
ALP SERPL-CCNC: 99 U/L (ref 30–99)
ALT SERPL-CCNC: 21 U/L (ref 2–50)
ANION GAP SERPL CALC-SCNC: 15 MMOL/L (ref 7–16)
AST SERPL-CCNC: 21 U/L (ref 12–45)
BILIRUB SERPL-MCNC: 0.4 MG/DL (ref 0.1–1.5)
BUN SERPL-MCNC: 4 MG/DL (ref 8–22)
CALCIUM SERPL-MCNC: 8.6 MG/DL (ref 8.4–10.2)
CHLORIDE SERPL-SCNC: 103 MMOL/L (ref 96–112)
CO2 SERPL-SCNC: 22 MMOL/L (ref 20–33)
CREAT SERPL-MCNC: 0.49 MG/DL (ref 0.5–1.4)
GLOBULIN SER CALC-MCNC: 2.6 G/DL (ref 1.9–3.5)
GLUCOSE SERPL-MCNC: 97 MG/DL (ref 65–99)
HCT VFR BLD AUTO: 34.5 % (ref 37–47)
HGB BLD-MCNC: 11.4 G/DL (ref 12–16)
POTASSIUM SERPL-SCNC: 3.7 MMOL/L (ref 3.6–5.5)
PROT SERPL-MCNC: 6.1 G/DL (ref 6–8.2)
SODIUM SERPL-SCNC: 140 MMOL/L (ref 135–145)

## 2020-09-16 PROCEDURE — 99239 HOSP IP/OBS DSCHRG MGMT >30: CPT | Performed by: HOSPITALIST

## 2020-09-16 PROCEDURE — 700111 HCHG RX REV CODE 636 W/ 250 OVERRIDE (IP): Performed by: INTERNAL MEDICINE

## 2020-09-16 PROCEDURE — 85014 HEMATOCRIT: CPT

## 2020-09-16 PROCEDURE — 700101 HCHG RX REV CODE 250: Performed by: INTERNAL MEDICINE

## 2020-09-16 PROCEDURE — 700102 HCHG RX REV CODE 250 W/ 637 OVERRIDE(OP): Performed by: INTERNAL MEDICINE

## 2020-09-16 PROCEDURE — A9270 NON-COVERED ITEM OR SERVICE: HCPCS | Performed by: NURSE PRACTITIONER

## 2020-09-16 PROCEDURE — 80053 COMPREHEN METABOLIC PANEL: CPT

## 2020-09-16 PROCEDURE — A9270 NON-COVERED ITEM OR SERVICE: HCPCS | Performed by: INTERNAL MEDICINE

## 2020-09-16 PROCEDURE — 85018 HEMOGLOBIN: CPT

## 2020-09-16 PROCEDURE — 700102 HCHG RX REV CODE 250 W/ 637 OVERRIDE(OP): Performed by: NURSE PRACTITIONER

## 2020-09-16 RX ORDER — LISINOPRIL 10 MG/1
10 TABLET ORAL DAILY
Qty: 30 TAB | Refills: 2
Start: 2020-09-16

## 2020-09-16 RX ORDER — SIMVASTATIN 20 MG
20 TABLET ORAL EVERY EVENING
Qty: 30 TAB | Refills: 2
Start: 2020-09-16

## 2020-09-16 RX ORDER — DICYCLOMINE HYDROCHLORIDE 10 MG/1
10 CAPSULE ORAL
Qty: 120 CAP | Refills: 2
Start: 2020-09-16

## 2020-09-16 RX ADMIN — METRONIDAZOLE 500 MG: 500 INJECTION, SOLUTION INTRAVENOUS at 05:33

## 2020-09-16 RX ADMIN — LISINOPRIL 10 MG: 5 TABLET ORAL at 05:32

## 2020-09-16 RX ADMIN — ACETAMINOPHEN 500 MG: 500 TABLET, FILM COATED ORAL at 05:32

## 2020-09-16 RX ADMIN — CIPROFLOXACIN 400 MG: 2 INJECTION, SOLUTION INTRAVENOUS at 05:33

## 2020-09-16 RX ADMIN — ACETAMINOPHEN 500 MG: 500 TABLET, FILM COATED ORAL at 02:02

## 2020-09-16 RX ADMIN — Medication 100 MG: at 05:32

## 2020-09-16 RX ADMIN — ACETAMINOPHEN 500 MG: 500 TABLET, FILM COATED ORAL at 09:54

## 2020-09-16 RX ADMIN — THERA TABS 1 TABLET: TAB at 05:32

## 2020-09-16 RX ADMIN — POTASSIUM CHLORIDE 40 MEQ: 20 TABLET, EXTENDED RELEASE ORAL at 05:30

## 2020-09-16 RX ADMIN — FOLIC ACID 1 MG: 1 TABLET ORAL at 05:32

## 2020-09-16 NOTE — DISCHARGE INSTRUCTIONS
Discharge Instructions    Discharged to home by car with . Discharged via wheelchair, hospital escort: Yes.  Special equipment needed: Not Applicable    Be sure to schedule a follow-up appointment with your primary care doctor or any specialists as instructed.     Discharge Plan:        I understand that a diet low in cholesterol, fat, and sodium is recommended for good health. Unless I have been given specific instructions below for another diet, I accept this instruction as my diet prescription.   Other diet: Full liquid- progress as tolerated     Special Instructions:      Colitis    Colitis is inflammation of the colon. Colitis may last a short time (be acute), or it may last a long time (become chronic).  What are the causes?  This condition may be caused by:  · Viruses.  · Bacteria.  · Reaction to medicine.  · Certain autoimmune diseases such as Crohn's disease or ulcerative colitis.  · Radiation treatment.  · Decreased blood flow to the bowel (ischemia).  What are the signs or symptoms?  Symptoms of this condition include:  · Watery diarrhea.  · Passing bloody or tarry stool.  · Pain.  · Fever.  · Vomiting.  · Tiredness (fatigue).  · Weight loss.  · Bloating.  · Abdominal pain.  · Having fewer bowel movements than usual.  · A strong and sudden urge to have a bowel movement.  · Feeling like the bowel is not empty after a bowel movement.  How is this diagnosed?  This condition is diagnosed with a stool test or a blood test.  You may also have other tests, such as:  · X-rays.  · CT scan.  · Colonoscopy.  · Endoscopy.  · Biopsy.  How is this treated?  Treatment for this condition depends on the cause. The condition may be treated by:  · Resting the bowel. This involves not eating or drinking for a period of time.  · Fluids that are given through an IV.  · Medicine for pain and diarrhea.  · Antibiotic medicines.  · Cortisone medicines.  · Surgery.  Follow these instructions at home:  Eating and  drinking    · Follow instructions from your health care provider about eating or drinking restrictions.  · Drink enough fluid to keep your urine pale yellow.  · Work with a dietitian to determine which foods cause your condition to flare up.  · Avoid foods that cause flare-ups.  · Eat a well-balanced diet.  General instructions  · If you were prescribed an antibiotic medicine, take it as told by your health care provider. Do not stop taking the antibiotic even if you start to feel better.  · Take over-the-counter and prescription medicines only as told by your health care provider.  · Keep all follow-up visits as told by your health care provider. This is important.  Contact a health care provider if:  · Your symptoms do not go away.  · You develop new symptoms.  Get help right away if you:  · Have a fever that does not go away with treatment.  · Develop chills.  · Have extreme weakness, fainting, or dehydration.  · Have repeated vomiting.  · Develop severe pain in your abdomen.  · Pass bloody or tarry stool.  Summary  · Colitis is inflammation of the colon. Colitis may last a short time (be acute), or it may last a long time (become chronic).  · Treatment for this condition depends on the cause and may include resting the bowel, taking medicines, or having surgery.  · If you were prescribed an antibiotic medicine, take it as told by your health care provider. Do not stop taking the antibiotic even if you start to feel better.  · Get help right away if you develop severe pain in your abdomen.  · Keep all follow-up visits as told by your health care provider. This is important.  This information is not intended to replace advice given to you by your health care provider. Make sure you discuss any questions you have with your health care provider.  Document Released: 01/25/2006 Document Revised: 06/20/2019 Document Reviewed: 06/20/2019  Elsevier Patient Education © 2020 "[employer] Inc[employer]              · Is patient  discharged on Warfarin / Coumadin?   No     Depression / Suicide Risk    As you are discharged from this [facility] facility, it is important to learn how to keep safe from harming yourself.    Recognize the warning signs:  · Abrupt changes in personality, positive or negative- including increase in energy   · Giving away possessions  · Change in eating patterns- significant weight changes-  positive or negative  · Change in sleeping patterns- unable to sleep or sleeping all the time   · Unwillingness or inability to communicate  · Depression  · Unusual sadness, discouragement and loneliness  · Talk of wanting to die  · Neglect of personal appearance   · Rebelliousness- reckless behavior  · Withdrawal from people/activities they love  · Confusion- inability to concentrate     If you or a loved one observes any of these behaviors or has concerns about self-harm, here's what you can do:  · Talk about it- your feelings and reasons for harming yourself  · Remove any means that you might use to hurt yourself (examples: pills, rope, extension cords, firearm)  · Get professional help from the community (Mental Health, Substance Abuse, psychological counseling)  · Do not be alone:Call your Safe Contact- someone whom you trust who will be there for you.  · Call your local CRISIS HOTLINE 908-7022 or 747-498-2336  · Call your local Children's Mobile Crisis Response Team [place] (635) 459-8473 or www.[employer]  · Call the toll free National Suicide Prevention Hotlines   · National Suicide Prevention Lifeline 299-284-KSES (2516)  · National Hope Line Network 800-SUICIDE (500-8569)    Discharge Instructions per TORY Villalta    -GLEN with PCP at [facility]    DIET: Full liquid - advance to GI soft as tolerated    ACTIVITY: As Tolerated    DIAGNOSIS: Colitis    Return to ER if you have worsening abdominal pain, bloody stools, bloody vomit, chest pain, shortness of breath, dizziness, or increased  weakness.

## 2020-09-16 NOTE — PROGRESS NOTES
Received shift handoff report from GWENDOLYN Renner. Pt is in bed and stable. Bed locked and in lowest position, upper side rails up, call light in reach, whiteboard updated. POC discussed and pt verbalizes understanding. Will continue to monitor.

## 2020-09-16 NOTE — CARE PLAN
Problem: Bowel/Gastric:  Goal: Normal bowel function is maintained or improved  Outcome: PROGRESSING AS EXPECTED     Problem: Discharge Barriers/Planning  Goal: Patient's continuum of care needs will be met  Outcome: PROGRESSING AS EXPECTED     Provided education about discharge barriers. Pt needs to continue to have bowel movements with no blood in stool. Pt verbalized that she had four bowel movements since last night and no blood in stool. Pt reports no  abdominal pain or cramping.

## 2020-09-16 NOTE — DISCHARGE SUMMARY
Discharge Summary    CHIEF COMPLAINT ON ADMISSION  Chief Complaint   Patient presents with   • Abdominal Pain   • Diarrhea   • Nausea   • Bloody Stools       Reason for Admission  Abdominal Pain      Admission Date  9/12/2020    CODE STATUS  Full Code    HPI & HOSPITAL COURSE  Ms. Stewart is a 60-year-old female with PMH EtOH dependence, HLD and HTN who presented 9/12/2020 with abdominal pain and bright red blood per rectum.  Patient is visiting [place] with her  on vacation when she developed sudden onset severe lower abdominal pain with associated BRBPR.  Patient did endorse daily drinking with occasional binging.  Average daily EtOH intake is 1 bottle of wine. Of note, patient has an upcoming dental procedure and has been taking Augmentin.     On presentation she was hemodynamically stable with leukocytosis, WBC 14.3 and elevated liver enzymes. US RUQ showed increased liver echogenicity consistent with hepatic steatosis, otherwise unremarkable.     She was treated with IV fluid hydration and IV ABX in the setting of acute colitis. Attempted to check C. difficile, however still contain too much blood and sample was denied by lab.    Hemoglobins were trended throughout her hospitalization and remained stable, >11.5.      She was seen and examined prior to discharge.  She has had 4 bowel movements without any blood.  She is tolerating a full liquid diet without increased abdominal pain and rates her current pain at 0/10.  She has completed antibiotic course of Cipro and Flagyl.  We discussed our recommendations which include follow-up with her PCP and GI at [facility] when she returns home as well as importance of alcohol cessation.  In the meantime, she is instructed to present to the nearest urgent care/emergency department for any recurrence of her abdominal pain, bloody stools, uncontrolled nausea or vomiting, or inability to take p.o.  She is agreeable to the plan of care and very eager for discharge home  today.    Therefore, she is discharged in good and stable condition to home with close outpatient follow-up.    The patient met 2-midnight criteria for an inpatient stay at the time of discharge.    Discharge Date  09/16/20    FOLLOW UP ITEMS POST DISCHARGE  Discharge Instructions per TORY Villalta    -FU with PCP at [facility]    DIET: Full liquid - advance to GI soft as tolerated    ACTIVITY: As Tolerated    DIAGNOSIS: Colitis    Return to ER if you have worsening abdominal pain, bloody stools, bloody vomit, chest pain, shortness of breath, dizziness, or increased weakness.    DISCHARGE DIAGNOSES  Principal Problem:    Colitis POA: Yes  Active Problems:    Alcohol dependence (HCC) POA: Yes  Resolved Problems:    Transaminitis POA: Unknown    Leukocytosis POA: Unknown    Lactic acidosis POA: Unknown    Hypokalemia POA: Unknown      FOLLOW UP  Primary Care Physician      Please call to schedule your hospital follow up with your primary care physician when you return home. Thank you       MEDICATIONS ON DISCHARGE     Medication List      CHANGE how you take these medications      Instructions   simvastatin 20 MG Tabs  What changed: when to take this  Commonly known as: ZOCOR   Take 1 Tab by mouth every evening.  Dose: 20 mg        CONTINUE taking these medications      Instructions   dicyclomine 10 MG Caps  Commonly known as: BENTYL   Take 1 Cap by mouth 4 Times a Day,Before Meals and at Bedtime.  Dose: 10 mg     lisinopril 10 MG Tabs  Commonly known as: PRINIVIL   Take 1 Tab by mouth every day.  Dose: 10 mg        STOP taking these medications    amoxicillin 500 MG Caps  Commonly known as: AMOXIL            Allergies  Not on File    DIET  Orders Placed This Encounter   Procedures   • Diet Order Full Liquid     Standing Status:   Standing     Number of Occurrences:   1     Order Specific Question:   Diet:     Answer:   Full Liquid [11]       ACTIVITY  As tolerated.  Weight bearing as  tolerated    CONSULTATIONS  None    PROCEDURES  None    LABORATORY  Lab Results   Component Value Date    SODIUM 140 09/16/2020    POTASSIUM 3.7 09/16/2020    CHLORIDE 103 09/16/2020    CO2 22 09/16/2020    GLUCOSE 97 09/16/2020    BUN 4 (L) 09/16/2020    CREATININE 0.49 (L) 09/16/2020        Lab Results   Component Value Date    WBC 8.5 09/15/2020    HEMOGLOBIN 11.4 (L) 09/16/2020    HEMATOCRIT 34.5 (L) 09/16/2020    PLATELETCT 153 (L) 09/15/2020        Total time of the discharge process exceeds 40 minutes.    I have performed a physical exam and reviewed and updated ROS and Assessment/Plan today (09/16/20). In review of the previous note there are no changes except as documented above    Please note that this dictation was created using voice recognition software. I have made every reasonable attempt to correct obvious errors, but there may be errors of grammar and possibly content that I did not discover before finalizing the note.

## 2020-09-16 NOTE — PROGRESS NOTES
Zaid texted ANGELITO Capellan about discharge instructions. ANGELITO Capellan said pt is okay to d/c home and schedule follow-up appointment within a week with PCP.

## 2020-09-16 NOTE — PROGRESS NOTES
Received discharge orders. Removed IV. Went over discharge instructions with Ayde. All questions answered. Patient feels ready to discharge. Instructed pt to follow-up with PCP Charlie when she gets home. Pt verbalized understanding. Patient escorted by transport via wheelchair. Patient went home with . All belongings gathered and patient dressed.

## 2020-09-16 NOTE — CARE PLAN
Problem: Communication  Goal: The ability to communicate needs accurately and effectively will improve  Outcome: PROGRESSING AS EXPECTED  Note: Discussed use of pain scale, call light, medications and nonpharmacologic ways to control pain. Whiteboard updated, POC discussed.      Problem: Pain Management  Goal: Pain level will decrease to patient's comfort goal  Outcome: PROGRESSING AS EXPECTED  Flowsheets  Taken 9/15/2020 2030 by Jud Landry RAPRIL  Comfort Goal:   0   Comfort with Movement   Perform Activity  Pain Rating Scale (NPRS): 0  Taken 9/15/2020 1200 by Mara Washington RAPRIL  Non Verbal Scale:   Sleeping   Unlabored Breathing  Note: Medicated for pain as needed, non pharm methods of comfort used and taught. Pt educated on use of pain scale.

## 2020-09-17 LAB
BACTERIA BLD CULT: NORMAL
BACTERIA BLD CULT: NORMAL
SIGNIFICANT IND 70042: NORMAL
SIGNIFICANT IND 70042: NORMAL
SITE SITE: NORMAL
SITE SITE: NORMAL
SOURCE SOURCE: NORMAL
SOURCE SOURCE: NORMAL